# Patient Record
Sex: MALE | Race: WHITE | NOT HISPANIC OR LATINO | Employment: FULL TIME | URBAN - NONMETROPOLITAN AREA
[De-identification: names, ages, dates, MRNs, and addresses within clinical notes are randomized per-mention and may not be internally consistent; named-entity substitution may affect disease eponyms.]

---

## 2020-05-12 ENCOUNTER — HISTORICAL (OUTPATIENT)
Dept: ADMINISTRATIVE | Facility: HOSPITAL | Age: 55
End: 2020-05-12

## 2020-05-14 LAB — SARS-COV-2 RNA RESP QL NAA+PROBE: NOT DETECTED

## 2021-05-26 PROBLEM — I10 HYPERTENSION: Status: ACTIVE | Noted: 2021-05-26

## 2021-05-26 PROBLEM — K75.81 NASH (NONALCOHOLIC STEATOHEPATITIS): Status: ACTIVE | Noted: 2021-05-26

## 2021-05-26 PROBLEM — E03.9 HYPOTHYROIDISM: Status: ACTIVE | Noted: 2021-05-26

## 2021-05-26 PROBLEM — I65.29 CAROTID ARTERY STENOSIS: Status: ACTIVE | Noted: 2021-05-26

## 2021-05-26 PROBLEM — R73.01 IFG (IMPAIRED FASTING GLUCOSE): Status: ACTIVE | Noted: 2021-05-26

## 2021-05-26 PROBLEM — M25.50 JOINT PAIN: Status: ACTIVE | Noted: 2021-05-26

## 2021-05-26 PROBLEM — K42.9 UMBILICAL HERNIA: Status: ACTIVE | Noted: 2021-05-26

## 2021-05-26 PROBLEM — E78.5 HYPERLIPIDEMIA: Status: ACTIVE | Noted: 2021-05-26

## 2021-05-26 PROBLEM — E55.9 VITAMIN D DEFICIENCY: Status: ACTIVE | Noted: 2021-05-26

## 2021-10-27 DIAGNOSIS — M25.511 RIGHT SHOULDER PAIN: Primary | ICD-10-CM

## 2021-10-28 ENCOUNTER — HOSPITAL ENCOUNTER (OUTPATIENT)
Dept: RADIOLOGY | Facility: HOSPITAL | Age: 56
Discharge: HOME | End: 2021-10-28
Attending: PREVENTIVE MEDICINE
Payer: COMMERCIAL

## 2021-10-28 DIAGNOSIS — M25.511 RIGHT SHOULDER PAIN: ICD-10-CM

## 2021-10-28 PROCEDURE — 73221 MRI JOINT UPR EXTREM W/O DYE: CPT | Mod: TC,RT

## 2021-11-29 PROBLEM — Z00.00 ROUTINE GENERAL MEDICAL EXAMINATION AT A HEALTH CARE FACILITY: Status: ACTIVE | Noted: 2021-11-29

## 2021-11-29 PROBLEM — R80.9 MICROALBUMINURIA: Status: ACTIVE | Noted: 2021-11-29

## 2021-11-29 PROBLEM — J30.9 AR (ALLERGIC RHINITIS): Status: ACTIVE | Noted: 2021-11-29

## 2021-12-27 ENCOUNTER — HOSPITAL ENCOUNTER (OUTPATIENT)
Dept: RADIOLOGY | Facility: HOSPITAL | Age: 56
Discharge: HOME | End: 2021-12-27
Attending: NURSE PRACTITIONER
Payer: COMMERCIAL

## 2021-12-27 DIAGNOSIS — R22.2 LOCALIZED SWELLING, MASS AND LUMP, TRUNK: ICD-10-CM

## 2021-12-27 DIAGNOSIS — R05.9 COUGH: ICD-10-CM

## 2021-12-27 PROBLEM — R91.8 LUNG MASS: Status: ACTIVE | Noted: 2021-12-27

## 2021-12-27 PROCEDURE — 25500020 PHARM REV CODE 255: Performed by: NURSE PRACTITIONER

## 2021-12-27 PROCEDURE — 71270 CT THORAX DX C-/C+: CPT | Mod: TC

## 2021-12-27 RX ADMIN — IOHEXOL 100 ML: 350 INJECTION, SOLUTION INTRAVENOUS at 04:12

## 2021-12-30 ENCOUNTER — LAB VISIT (OUTPATIENT)
Dept: LAB | Facility: HOSPITAL | Age: 56
End: 2021-12-30
Attending: FAMILY MEDICINE
Payer: COMMERCIAL

## 2021-12-30 DIAGNOSIS — R91.8 LUNG MASS: ICD-10-CM

## 2021-12-30 DIAGNOSIS — R05.9 COUGH: ICD-10-CM

## 2021-12-30 PROCEDURE — 87070 CULTURE OTHR SPECIMN AEROBIC: CPT | Performed by: FAMILY MEDICINE

## 2021-12-30 PROCEDURE — 87102 FUNGUS ISOLATION CULTURE: CPT | Performed by: FAMILY MEDICINE

## 2021-12-30 PROCEDURE — 87205 SMEAR GRAM STAIN: CPT | Performed by: FAMILY MEDICINE

## 2021-12-30 PROCEDURE — 87106 FUNGI IDENTIFICATION YEAST: CPT | Performed by: FAMILY MEDICINE

## 2022-01-03 LAB
BACTERIA SPEC AEROBE CULT: NORMAL
BACTERIA SPEC AEROBE CULT: NORMAL
GRAM STN SPEC: NORMAL

## 2022-01-06 NOTE — PROGRESS NOTES
DATE OF SURGERY:  06/15/2017   SURGEON:  Michael Rick II, MD      PREOPERATIVE DIAGNOSIS:  Biliary colic.      POSTOPERATIVE DIAGNOSIS:  Biliary colic.      PROCEDURE:  Laparoscopic cholecystectomy.      ANESTHESIA:  General.      ESTIMATED BLOOD LOSS:  Minimal.      COMPLICATIONS:  None.      SPECIMEN:  Gallbladder.      DISPOSITION:  To PACU in stable condition.      DESCRIPTION OF PROCEDURE:  The patient was brought to the operative suite,   placed supine on operative table.  After adequate general anesthesia had been   achieved, the abdomen was prepped and draped in usual sterile fashion.   Then,   using a 15 blade, an infraumbilical curvilinear incision was made.  The   incision was extended down to the underlying subcutaneous tissue and cautery   was used for hemostasis.  The fascia was incised, then a Kinza trocar was   inserted to the abdomen.  The abdomen was then insufflated with CO2 to create   the pneumoperitoneum and 3 additional 5 mm trocars were inserted to the   abdomen on direct visualization, 2 in the left upper quadrant and 1 in the   right upper quadrant.  After this was completed, the gallbladder was   visualized, noted to be chronically inflamed.  It was then retracted cephalad.   Cystic duct and cystic artery were then both dissected out of the triangle of   Calot, clipped and divided.  The gallbladder was then dissected off the   gallbladder fossa Kinza excision and once completed, the gallbladder was   placed in an Endopouch to remove the umbilical port.  The pneumoperitoneum was   then released.  The fascia at the umbilicus was closed with 0 Vicryl suture in   a running fashion.  The skin incisions were then closed with 4-0 Vicryl in a   subcuticular fashion.  Dermabond dressings were applied.  The needle, sponge,   and instrument counts were correct at the end of the case.  The patient   tolerated the procedure well, was then transferred to the PACU in stable   condition.             Patient notified,Diflucan ordered            MD KERLINE Sevilla II/Eneida     DD:  06/15/2017 10:20:50 / DT:  06/15/2017 13:13:55     Job #:   [id number]

## 2022-01-11 ENCOUNTER — HOSPITAL ENCOUNTER (OUTPATIENT)
Dept: RADIOLOGY | Facility: HOSPITAL | Age: 57
Discharge: HOME | End: 2022-01-11
Attending: INTERNAL MEDICINE
Payer: COMMERCIAL

## 2022-01-11 DIAGNOSIS — R91.8 LUNG MASS: ICD-10-CM

## 2022-01-11 PROBLEM — E66.3 OVERWEIGHT: Status: ACTIVE | Noted: 2022-01-11

## 2022-01-11 PROCEDURE — 71046 X-RAY EXAM CHEST 2 VIEWS: CPT | Mod: TC

## 2022-01-31 LAB — FUNGUS SPEC CULT: ABNORMAL

## 2022-02-28 PROBLEM — Z00.00 ROUTINE GENERAL MEDICAL EXAMINATION AT A HEALTH CARE FACILITY: Status: RESOLVED | Noted: 2021-11-29 | Resolved: 2022-02-28

## 2022-06-20 DIAGNOSIS — H50.22 HYPOTROPIA OF LEFT EYE: Primary | ICD-10-CM

## 2022-06-22 ENCOUNTER — HOSPITAL ENCOUNTER (OUTPATIENT)
Dept: RADIOLOGY | Facility: HOSPITAL | Age: 57
Discharge: HOME | End: 2022-06-22
Attending: OPHTHALMOLOGY
Payer: COMMERCIAL

## 2022-06-22 DIAGNOSIS — H50.22 HYPOTROPIA OF LEFT EYE: ICD-10-CM

## 2022-06-22 PROCEDURE — 70480 CT ORBIT/EAR/FOSSA W/O DYE: CPT | Mod: TC

## 2022-11-14 PROBLEM — Z01.818 PREOPERATIVE CLEARANCE: Status: ACTIVE | Noted: 2022-11-14

## 2023-01-30 PROBLEM — E11.65 TYPE 2 DIABETES MELLITUS WITH HYPERGLYCEMIA, WITHOUT LONG-TERM CURRENT USE OF INSULIN: Status: ACTIVE | Noted: 2021-05-26

## 2023-05-01 PROBLEM — Z00.00 WELLNESS EXAMINATION: Status: RESOLVED | Noted: 2021-11-29 | Resolved: 2023-05-01

## 2023-09-25 ENCOUNTER — LAB VISIT (OUTPATIENT)
Dept: LAB | Facility: HOSPITAL | Age: 58
End: 2023-09-25
Attending: NURSE PRACTITIONER
Payer: COMMERCIAL

## 2023-09-25 DIAGNOSIS — Z79.899 ENCOUNTER FOR LONG-TERM (CURRENT) USE OF OTHER MEDICATIONS: ICD-10-CM

## 2023-09-25 DIAGNOSIS — E78.2 MIXED HYPERLIPIDEMIA: ICD-10-CM

## 2023-09-25 DIAGNOSIS — E03.9 HYPOTHYROIDISM, UNSPECIFIED TYPE: ICD-10-CM

## 2023-09-25 DIAGNOSIS — I10 PRIMARY HYPERTENSION: ICD-10-CM

## 2023-09-25 DIAGNOSIS — E11.65 TYPE 2 DIABETES MELLITUS WITH HYPERGLYCEMIA, WITHOUT LONG-TERM CURRENT USE OF INSULIN: ICD-10-CM

## 2023-09-25 LAB
ALBUMIN SERPL BCP-MCNC: 4.2 G/DL (ref 3.5–5.2)
ALP SERPL-CCNC: 73 U/L (ref 55–135)
ALT SERPL W/O P-5'-P-CCNC: 31 U/L (ref 10–44)
ANION GAP SERPL CALC-SCNC: 4 MMOL/L (ref 3–11)
AST SERPL-CCNC: 18 U/L (ref 10–40)
BILIRUB SERPL-MCNC: 0.4 MG/DL (ref 0.1–1)
BUN SERPL-MCNC: 9 MG/DL (ref 6–20)
CALCIUM SERPL-MCNC: 8.7 MG/DL (ref 8.7–10.5)
CHLORIDE SERPL-SCNC: 104 MMOL/L (ref 95–110)
CO2 SERPL-SCNC: 27 MMOL/L (ref 23–29)
CREAT SERPL-MCNC: 0.9 MG/DL (ref 0.5–1.4)
EST. GFR  (NO RACE VARIABLE): >60 ML/MIN/1.73 M^2
ESTIMATED AVG GLUCOSE: 114 MG/DL (ref 68–131)
GLUCOSE SERPL-MCNC: 115 MG/DL (ref 70–110)
HBA1C MFR BLD: 5.6 % (ref 4–5.6)
POTASSIUM SERPL-SCNC: 4.1 MMOL/L (ref 3.5–5.1)
PROT SERPL-MCNC: 7.7 G/DL (ref 6–8.4)
SODIUM SERPL-SCNC: 135 MMOL/L (ref 136–145)
T3FREE SERPL-MCNC: 2.5 PG/ML (ref 2.3–4.2)
T4 FREE SERPL-MCNC: 0.92 NG/DL (ref 0.71–1.51)
TSH SERPL DL<=0.005 MIU/L-ACNC: 3.76 UIU/ML (ref 0.4–4)

## 2023-09-25 PROCEDURE — 80053 COMPREHEN METABOLIC PANEL: CPT | Performed by: NURSE PRACTITIONER

## 2023-09-25 PROCEDURE — 83036 HEMOGLOBIN GLYCOSYLATED A1C: CPT | Performed by: NURSE PRACTITIONER

## 2023-09-25 PROCEDURE — 36415 COLL VENOUS BLD VENIPUNCTURE: CPT | Performed by: NURSE PRACTITIONER

## 2023-09-25 PROCEDURE — 84443 ASSAY THYROID STIM HORMONE: CPT | Performed by: NURSE PRACTITIONER

## 2023-09-25 PROCEDURE — 84481 FREE ASSAY (FT-3): CPT | Performed by: NURSE PRACTITIONER

## 2023-09-25 PROCEDURE — 84439 ASSAY OF FREE THYROXINE: CPT | Performed by: NURSE PRACTITIONER

## 2023-10-10 PROBLEM — R60.0 BILATERAL LOWER EXTREMITY EDEMA: Status: ACTIVE | Noted: 2023-10-10

## 2024-03-12 ENCOUNTER — LAB VISIT (OUTPATIENT)
Dept: LAB | Facility: HOSPITAL | Age: 59
End: 2024-03-12
Attending: NURSE PRACTITIONER
Payer: COMMERCIAL

## 2024-03-12 DIAGNOSIS — I65.23 BILATERAL CAROTID ARTERY STENOSIS: ICD-10-CM

## 2024-03-12 DIAGNOSIS — E11.65 TYPE 2 DIABETES MELLITUS WITH HYPERGLYCEMIA, WITHOUT LONG-TERM CURRENT USE OF INSULIN: ICD-10-CM

## 2024-03-12 DIAGNOSIS — I10 HYPERTENSION, UNSPECIFIED TYPE: ICD-10-CM

## 2024-03-12 DIAGNOSIS — R73.01 IMPAIRED FASTING GLUCOSE: ICD-10-CM

## 2024-03-12 DIAGNOSIS — E78.2 MIXED HYPERLIPIDEMIA: ICD-10-CM

## 2024-03-12 DIAGNOSIS — E55.9 VITAMIN D DEFICIENCY: ICD-10-CM

## 2024-03-12 DIAGNOSIS — I15.2 HYPERTENSION ASSOCIATED WITH TYPE 2 DIABETES MELLITUS: ICD-10-CM

## 2024-03-12 DIAGNOSIS — K75.81 NASH (NONALCOHOLIC STEATOHEPATITIS): ICD-10-CM

## 2024-03-12 DIAGNOSIS — R73.01 IFG (IMPAIRED FASTING GLUCOSE): ICD-10-CM

## 2024-03-12 DIAGNOSIS — E63.0 ESSENTIAL FATTY ACID DEFICIENCY: ICD-10-CM

## 2024-03-12 DIAGNOSIS — R80.9 MICROALBUMINURIA: ICD-10-CM

## 2024-03-12 DIAGNOSIS — E11.59 HYPERTENSION ASSOCIATED WITH TYPE 2 DIABETES MELLITUS: ICD-10-CM

## 2024-03-12 DIAGNOSIS — E03.9 HYPOTHYROIDISM, UNSPECIFIED TYPE: ICD-10-CM

## 2024-03-12 DIAGNOSIS — E78.5 HYPERLIPIDEMIA, UNSPECIFIED HYPERLIPIDEMIA TYPE: ICD-10-CM

## 2024-03-12 DIAGNOSIS — I65.21 OCCLUSION OF RIGHT CAROTID ARTERY: ICD-10-CM

## 2024-03-12 DIAGNOSIS — I10 PRIMARY HYPERTENSION: ICD-10-CM

## 2024-03-12 DIAGNOSIS — Z79.899 ENCOUNTER FOR LONG-TERM (CURRENT) USE OF OTHER MEDICATIONS: ICD-10-CM

## 2024-03-12 LAB
ALBUMIN SERPL BCP-MCNC: 4 G/DL (ref 3.5–5.2)
ALBUMIN/CREAT UR: 113.6 UG/MG (ref 0–30)
ALP SERPL-CCNC: 68 U/L (ref 55–135)
ALT SERPL W/O P-5'-P-CCNC: 30 U/L (ref 10–44)
ANION GAP SERPL CALC-SCNC: 6 MMOL/L (ref 3–11)
AST SERPL-CCNC: 17 U/L (ref 10–40)
BASOPHILS # BLD AUTO: 0.05 K/UL (ref 0–0.2)
BASOPHILS NFR BLD: 0.6 % (ref 0–1.9)
BILIRUB SERPL-MCNC: 0.5 MG/DL (ref 0.1–1)
BUN SERPL-MCNC: 12 MG/DL (ref 6–20)
CALCIUM SERPL-MCNC: 9.2 MG/DL (ref 8.7–10.5)
CHLORIDE SERPL-SCNC: 102 MMOL/L (ref 95–110)
CHOLEST SERPL-MCNC: 231 MG/DL (ref 120–199)
CHOLEST/HDLC SERPL: 4.5 {RATIO} (ref 2–5)
CO2 SERPL-SCNC: 28 MMOL/L (ref 23–29)
CREAT SERPL-MCNC: 0.9 MG/DL (ref 0.5–1.4)
CREAT UR-MCNC: 94.2 MG/DL (ref 23–375)
DIFFERENTIAL METHOD BLD: ABNORMAL
EOSINOPHIL # BLD AUTO: 0.2 K/UL (ref 0–0.5)
EOSINOPHIL NFR BLD: 2.4 % (ref 0–8)
ERYTHROCYTE [DISTWIDTH] IN BLOOD BY AUTOMATED COUNT: 13.6 % (ref 11.5–14.5)
EST. GFR  (NO RACE VARIABLE): >60 ML/MIN/1.73 M^2
ESTIMATED AVG GLUCOSE: 120 MG/DL (ref 68–131)
GLUCOSE SERPL-MCNC: 132 MG/DL (ref 70–110)
HBA1C MFR BLD: 5.8 % (ref 4–5.6)
HCT VFR BLD AUTO: 42.5 % (ref 40–54)
HDLC SERPL-MCNC: 51 MG/DL (ref 40–75)
HDLC SERPL: 22.1 % (ref 20–50)
HGB BLD-MCNC: 14.4 G/DL (ref 14–18)
IMM GRANULOCYTES # BLD AUTO: 0.04 K/UL (ref 0–0.04)
IMM GRANULOCYTES NFR BLD AUTO: 0.5 % (ref 0–0.5)
LDLC SERPL CALC-MCNC: 153.6 MG/DL (ref 63–159)
LYMPHOCYTES # BLD AUTO: 2.1 K/UL (ref 1–4.8)
LYMPHOCYTES NFR BLD: 24.8 % (ref 18–48)
MCH RBC QN AUTO: 32.2 PG (ref 27–31)
MCHC RBC AUTO-ENTMCNC: 33.9 G/DL (ref 32–36)
MCV RBC AUTO: 95 FL (ref 82–98)
MICROALBUMIN UR DL<=1MG/L-MCNC: 107 MG/L
MONOCYTES # BLD AUTO: 0.6 K/UL (ref 0.3–1)
MONOCYTES NFR BLD: 7.7 % (ref 4–15)
NEUTROPHILS # BLD AUTO: 5.3 K/UL (ref 1.8–7.7)
NEUTROPHILS NFR BLD: 64 % (ref 38–73)
NONHDLC SERPL-MCNC: 180 MG/DL
NRBC BLD-RTO: 0 /100 WBC
PLATELET # BLD AUTO: 237 K/UL (ref 150–450)
PMV BLD AUTO: 9.8 FL (ref 9.2–12.9)
POTASSIUM SERPL-SCNC: 4.2 MMOL/L (ref 3.5–5.1)
PROT SERPL-MCNC: 7.7 G/DL (ref 6–8.4)
RBC # BLD AUTO: 4.47 M/UL (ref 4.6–6.2)
SODIUM SERPL-SCNC: 136 MMOL/L (ref 136–145)
T3FREE SERPL-MCNC: 2.3 PG/ML (ref 2.3–4.2)
T4 FREE SERPL-MCNC: 0.73 NG/DL (ref 0.71–1.51)
TRIGL SERPL-MCNC: 132 MG/DL (ref 30–150)
TSH SERPL DL<=0.005 MIU/L-ACNC: 57.6 UIU/ML (ref 0.4–4)
WBC # BLD AUTO: 8.31 K/UL (ref 3.9–12.7)

## 2024-03-12 PROCEDURE — 83036 HEMOGLOBIN GLYCOSYLATED A1C: CPT | Performed by: NURSE PRACTITIONER

## 2024-03-12 PROCEDURE — 84443 ASSAY THYROID STIM HORMONE: CPT | Performed by: NURSE PRACTITIONER

## 2024-03-12 PROCEDURE — 84481 FREE ASSAY (FT-3): CPT | Performed by: NURSE PRACTITIONER

## 2024-03-12 PROCEDURE — 80061 LIPID PANEL: CPT | Performed by: NURSE PRACTITIONER

## 2024-03-12 PROCEDURE — 85025 COMPLETE CBC W/AUTO DIFF WBC: CPT | Performed by: NURSE PRACTITIONER

## 2024-03-12 PROCEDURE — 82043 UR ALBUMIN QUANTITATIVE: CPT | Performed by: NURSE PRACTITIONER

## 2024-03-12 PROCEDURE — 36415 COLL VENOUS BLD VENIPUNCTURE: CPT | Performed by: NURSE PRACTITIONER

## 2024-03-12 PROCEDURE — 80053 COMPREHEN METABOLIC PANEL: CPT | Performed by: NURSE PRACTITIONER

## 2024-03-12 PROCEDURE — 84439 ASSAY OF FREE THYROXINE: CPT | Performed by: NURSE PRACTITIONER

## 2024-03-26 PROBLEM — H50.22 HYPERTROPIA OF LEFT EYE: Status: ACTIVE | Noted: 2024-03-26

## 2024-05-17 ENCOUNTER — LAB VISIT (OUTPATIENT)
Dept: LAB | Facility: HOSPITAL | Age: 59
End: 2024-05-17
Attending: NURSE PRACTITIONER
Payer: COMMERCIAL

## 2024-05-17 DIAGNOSIS — E03.9 HYPOTHYROIDISM, UNSPECIFIED TYPE: ICD-10-CM

## 2024-05-17 LAB
T3FREE SERPL-MCNC: 3.1 PG/ML (ref 2.3–4.2)
T4 FREE SERPL-MCNC: 1.52 NG/DL (ref 0.71–1.51)
TSH SERPL DL<=0.005 MIU/L-ACNC: 0.15 UIU/ML (ref 0.4–4)

## 2024-05-17 PROCEDURE — 84443 ASSAY THYROID STIM HORMONE: CPT | Performed by: NURSE PRACTITIONER

## 2024-05-17 PROCEDURE — 84481 FREE ASSAY (FT-3): CPT | Performed by: NURSE PRACTITIONER

## 2024-05-17 PROCEDURE — 84439 ASSAY OF FREE THYROXINE: CPT | Performed by: NURSE PRACTITIONER

## 2024-05-17 PROCEDURE — 36415 COLL VENOUS BLD VENIPUNCTURE: CPT | Performed by: NURSE PRACTITIONER

## 2024-09-02 PROBLEM — Z00.00 WELLNESS EXAMINATION: Status: RESOLVED | Noted: 2021-11-29 | Resolved: 2024-09-02

## 2024-10-21 DIAGNOSIS — Z12.11 SCREENING FOR COLON CANCER: ICD-10-CM

## 2024-11-01 ENCOUNTER — OFFICE VISIT (OUTPATIENT)
Dept: OPHTHALMOLOGY | Facility: CLINIC | Age: 59
End: 2024-11-01
Payer: COMMERCIAL

## 2024-11-01 VITALS — BODY MASS INDEX: 28.34 KG/M2 | HEIGHT: 66 IN | WEIGHT: 176.38 LBS

## 2024-11-01 DIAGNOSIS — E11.9 TYPE 2 DIABETES MELLITUS WITHOUT RETINOPATHY: ICD-10-CM

## 2024-11-01 DIAGNOSIS — H50.22 HYPERTROPIA OF LEFT EYE: ICD-10-CM

## 2024-11-01 DIAGNOSIS — H02.402 PTOSIS, LEFT EYELID: Primary | ICD-10-CM

## 2024-11-01 PROCEDURE — 99214 OFFICE O/P EST MOD 30 MIN: CPT | Mod: PBBFAC,PN

## 2024-11-04 PROBLEM — H02.402 PTOSIS, LEFT EYELID: Status: ACTIVE | Noted: 2024-11-04

## 2024-11-06 ENCOUNTER — TELEPHONE (OUTPATIENT)
Dept: PHARMACY | Facility: CLINIC | Age: 59
End: 2024-11-06
Payer: COMMERCIAL

## 2024-11-06 NOTE — TELEPHONE ENCOUNTER
[facility]/Population Health Chart Review & Patient Outreach Details For Medication Adherence Project    Reason for Outreach Encounter: 3rd Party payor non-compliance report ([employer], BCBS, [employer], etc)  2.  Patient Outreach Method: Reviewed patient chart   3.   Medication in question:   Diabetes Medications               semaglutide (OZEMPIC) 0.25 mg or 0.5 mg (2 mg/3 mL) pen injector INJECT 0.5 MG INTO THE SKIN EVERY 7 DAYS                 ozempic  last filled  10/22 for 28 day supply      4.  Reviewed and or Updates Made To: Patient Chart  5. Outreach Outcomes and/or actions taken: Patient filled medication and is on track to be adherent  Additional Notes:

## 2024-11-07 ENCOUNTER — TELEPHONE (OUTPATIENT)
Dept: PHARMACY | Facility: CLINIC | Age: 59
End: 2024-11-07
Payer: COMMERCIAL

## 2024-11-07 NOTE — TELEPHONE ENCOUNTER
[facility]/Population Health Chart Review & Patient Outreach Details For Medication Adherence Project    Reason for Outreach Encounter: 3rd Party payor non-compliance report ([employer], BCBS, UHC, etc)  2.  Patient Outreach Method: Reviewed patient chart   3.   Medication in question: pitavastatin 4 mg     pitavastatin  last filled  8/22/24 for 90 day supply    4.  Reviewed and or Updates Made To: Patient Chart  5. Outreach Outcomes and/or actions taken: Patient filled medication and is on track to be adherent  Additional Notes:

## 2024-12-23 ENCOUNTER — TELEPHONE (OUTPATIENT)
Dept: PHARMACY | Facility: CLINIC | Age: 59
End: 2024-12-23
Payer: COMMERCIAL

## 2025-01-06 ENCOUNTER — OFFICE VISIT (OUTPATIENT)
Dept: OPHTHALMOLOGY | Facility: CLINIC | Age: 60
End: 2025-01-06
Payer: COMMERCIAL

## 2025-01-06 DIAGNOSIS — H50.22 HYPERTROPIA OF LEFT EYE: Primary | ICD-10-CM

## 2025-01-06 DIAGNOSIS — S02.32XS CLOSED FRACTURE OF LEFT ORBITAL FLOOR, SEQUELA: ICD-10-CM

## 2025-01-06 PROCEDURE — 99214 OFFICE O/P EST MOD 30 MIN: CPT | Mod: S$GLB,,, | Performed by: STUDENT IN AN ORGANIZED HEALTH CARE EDUCATION/TRAINING PROGRAM

## 2025-01-06 PROCEDURE — 92060 SENSORIMOTOR EXAMINATION: CPT | Mod: S$GLB,,, | Performed by: STUDENT IN AN ORGANIZED HEALTH CARE EDUCATION/TRAINING PROGRAM

## 2025-01-06 PROCEDURE — 1159F MED LIST DOCD IN RCRD: CPT | Mod: CPTII,S$GLB,, | Performed by: STUDENT IN AN ORGANIZED HEALTH CARE EDUCATION/TRAINING PROGRAM

## 2025-01-06 PROCEDURE — 1160F RVW MEDS BY RX/DR IN RCRD: CPT | Mod: CPTII,S$GLB,, | Performed by: STUDENT IN AN ORGANIZED HEALTH CARE EDUCATION/TRAINING PROGRAM

## 2025-01-06 PROCEDURE — 99999 PR PBB SHADOW E&M-EST. PATIENT-LVL III: CPT | Mod: PBBFAC,,, | Performed by: STUDENT IN AN ORGANIZED HEALTH CARE EDUCATION/TRAINING PROGRAM

## 2025-01-06 NOTE — PROGRESS NOTES
HPI    DLS: 11/1/2024 Dr. Toyin Ramirez   Gavin Lafleur is a 59 y.o. male who comes in for an evaluation of   strabismus. He states that his left eye constantly drifts upward since   injuring his left eye by hitting it on a vanity while sleep walking in   2022. He had a repair with plate in November 2022 with Dr. Gaming. He also   notes ZAHIDA droopiness, occasional left eye pain and floaters.    Pt reports he does have double vision if he concentrates but a lot of   timews can ignore the second image. Denies blurry vision.    Refresh gtts BID OU    Last edited by Leonardo Snyder MD on 1/6/2025  4:14 PM.        ROS    Negative for: Constitutional, Gastrointestinal, Neurological, Skin,   Genitourinary, Musculoskeletal, HENT, Endocrine, Cardiovascular, Eyes,   Respiratory, Psychiatric, Allergic/Imm, Heme/Lymph  Last edited by Leonardo Snyder MD on 1/6/2025  4:14 PM.        Assessment /Plan     For exam results, see Encounter Report.    Hypertropia of left eye    Closed fracture of left orbital floor, sequela        Problem List Items Addressed This Visit          Ophtho    Hypertropia of left eye - Primary    Current Assessment & Plan     Injury sustain to left orbit on 2/2/22   CT Orbits: 1. Mild fat stranding within the inferior retrobulbar left orbit, possibly inflammatory or posttraumatic.  2. Deformity left orbital floor, possibly related to old fracture.  3. Moderate paranasal sinus disease.    11/21/22 - S/p fracture repair with plate with Dr. Gaming    3/20/23: Seen with Dr. Molina with large left hypertropia - planned for surgery but postponed duet family issues     1/6/25: Patient bothered by large degree of strabismus. Has double vision and interferes with daily interactions.  Exam with large left hypertropia. Poor infraduction left eye but only mildly reduced saccades     Plan:  Discussed risks and benefits of strabismus surgery  Benefits include: realign eyes and improved binocularity  Risks include: Pain,  bleeding infection, transient or permanent redness, less attractive appearance, decreased vision, loss of vision, undercorrection, overcorrection, double vision, need for future surgery    Patient elects to proceed with strabismus surgery  Consider LSRc and LIR resection. Will do intra-op forced ductions. May need to do contralateral surgery vs staged procedure. Will discuss surgical plan with colleagues          Other Visit Diagnoses       Closed fracture of left orbital floor, sequela                 Leonardo Snyder MD  Pediatric Ophthalmology and Adult Strabismus  [facility]

## 2025-01-06 NOTE — ASSESSMENT & PLAN NOTE
Injury sustain to left orbit on 2/2/22   CT Orbits: 1. Mild fat stranding within the inferior retrobulbar left orbit, possibly inflammatory or posttraumatic.  2. Deformity left orbital floor, possibly related to old fracture.  3. Moderate paranasal sinus disease.    11/21/22 - S/p fracture repair with plate with Dr. Gaming    3/20/23: Seen with Dr. Molina with large left hypertropia - planned for surgery but postponed duet family issues     1/6/25: Patient bothered by large degree of strabismus. Has double vision and interferes with daily interactions.  Exam with large left hypertropia. Poor infraduction left eye but only mildly reduced saccades     Plan:  Discussed risks and benefits of strabismus surgery  Benefits include: realign eyes and improved binocularity  Risks include: Pain, bleeding infection, transient or permanent redness, less attractive appearance, decreased vision, loss of vision, undercorrection, overcorrection, double vision, need for future surgery    Patient elects to proceed with strabismus surgery  Consider LSRc and LIR resection. Will do intra-op forced ductions. May need to do contralateral surgery vs staged procedure. Will discuss surgical plan with colleagues

## 2025-01-08 ENCOUNTER — TELEPHONE (OUTPATIENT)
Dept: OPHTHALMOLOGY | Facility: CLINIC | Age: 60
End: 2025-01-08
Payer: COMMERCIAL

## 2025-01-08 DIAGNOSIS — H50.22 HYPERTROPIA OF LEFT EYE: Primary | ICD-10-CM

## 2025-02-20 ENCOUNTER — HOSPITAL ENCOUNTER (OUTPATIENT)
Dept: RADIOLOGY | Facility: HOSPITAL | Age: 60
Discharge: HOME | End: 2025-02-20
Attending: NURSE PRACTITIONER
Payer: COMMERCIAL

## 2025-02-20 DIAGNOSIS — Z01.818 PRE-OP EXAM: ICD-10-CM

## 2025-02-20 PROCEDURE — 71046 X-RAY EXAM CHEST 2 VIEWS: CPT | Mod: TC

## 2025-02-22 ENCOUNTER — TELEPHONE (OUTPATIENT)
Dept: PHARMACY | Facility: CLINIC | Age: 60
End: 2025-02-22
Payer: COMMERCIAL

## 2025-02-23 NOTE — TELEPHONE ENCOUNTER
[facility]/Population Health Chart Review & Patient Outreach Details For Medication Adherence Project    Reason for Outreach Encounter: 3rd Party payor non-compliance report ([employer], BCBS, UHC, etc)  2.  Patient Outreach Method: Reviewed patient chart   3.   Medication in question:    Hyperlipidemia Medications              pitavastatin calcium (LIVALO) 4 mg Tab TAKE 1 TABLET BY MOUTH ONCE DAILY IN THE EVENING                  pitavastatin  last filled  1/4 for 90 day supply      4.  Reviewed and or Updates Made To: Patient Chart  5. Outreach Outcomes and/or actions taken: Patient filled medication and is on track to be adherent  Additional Notes:

## 2025-02-25 ENCOUNTER — TELEPHONE (OUTPATIENT)
Dept: OPHTHALMOLOGY | Facility: CLINIC | Age: 60
End: 2025-02-25
Payer: COMMERCIAL

## 2025-02-25 NOTE — PRE-PROCEDURE INSTRUCTIONS
PreOp Instructions given:    -- Medication information (what to hold and what to take)   -- NPO guidelines as follows: (or as per your Surgeon)  Stop ALL solid food, gum, candy 8 hours before arrival time.  Stop all CLOUDY liquids: coffee with creamer, cloudy juices, 8 hours prior to arrival time.  The patient should be ENCOURAGED to drink carbohydrate-rich clear liquids (sports drinks, clear juices) until 2 hours prior to arrival time.  Stop clear liquids 2 hours prior to arrival time.  CLEAR liquids include water, black coffee NO creamer, clear oral rehydration drinks, clear sports drinks and clear fruit juices (no orange juice, no pulpy juices, no apple cider).   IF IN DOUBT, drink water instead.   NOTHING TO DRINK 2 hours before to surgery/procedure  time. If you are told to take medication on the morning of surgery, it may be taken with a sip of water.   -- *Arrival place and directions given*.  Time to be given the day before procedure by the Surgeon's Office   -- Bathe with antibacterial soap (dial or Hibiclens as instructed)  -- Don't wear any jewelry or valuables and not metals on skin or hair AM of surgery   -- No makeup or moisturizer to face   -- No perfume/cologne, powder, lotions, aftershave or deodorant    Pt verbalized understanding.         *If going to , see below:     Directions and Instructions for [facility]   At [facility], we have an outstanding team of physicians, anesthesiologists, CRNAs, Registered Nurses, Surgical Technologists, and other ancillary team members all focused on your surgical and procedural care.   Before Your Procedure:   The physician's office will call you with a specific arrival time and directions a day or two before your scheduled procedure. You may also receive these instructions through your [facility] portal.   Day of Procedure:   Please be sure to arrive at the arrival time given or you may risk your surgery being delayed or  canceled. The arrival time is earlier than your scheduled surgery or procedure time. In the winter months please dress warm and bring blankets for you or your child as the waiting room may be cold. If you have difficulty locating the facility, please give us a call at [phone].   Directions:   The [facility] is located on the 1st floor of the hospital building near the [place] entrance.   Parking:   You will park in the [facility] (note location on map). [facility] opens at 5:00 a.m. and has a drop off area by the entrance.  parking is available starting at 7:00 a.m. Please see below for further  parking instructions.   Directions from the parking garage elevators   Blue [facility] Elevators: From the parking garage, take the blue [place] elevators (located in the center of the parking garage) to the 1st floor of the garage. You will then take a right once off the elevators then another right to the outside of the parking garage. You will be across from the [facility]. You will walk down the sidewalk, pass the  curve at the [place] entrance and continue to follow the sidewalk. You will pass the radiation oncology entrance on your right. Continue to follow the sidewalk to the [facility] glass door entrance.   Hospital Entrance (Inside Route): If a mostly inside route is preferred: Take the inside elevator bank (located at the far north end of the garage) from the parking garage to the 1st floor. On the 1st floor walk past [employer]. Keep walking down the center of the hallway towards the hospital elevators. Once you reach the red brick brianda, take a left and go past the hospital elevators. Take another left and follow the blue and white [place] signs around the hallway to the end. Go outside of the door. You will see the [facility] entrance to your right.   Drop Off:   There is a drop off area at  the doors of the [facility] for your convenience. If utilized for pediatric patients, an adult must accompany the patient into the surgery center while another adult lucas the vehicle.   Erin (at 7:00 a.m.):   Upon check-in, please let the  know that you are utilizing [facility] parking which is free. The . will then call [employer] for your car to be picked up. Your keys and phone number will be collected and given to [employer] services. You will then be given a ticket. Upon discharge, [facility] will be notified to bring your vehicle back when you are ready.   2/6/2024      If going to 2nd floor surgery center, see below:    Directions to the 2nd floor ([facility]) Surgery Center  The hallway to get to the surgery center is on the 2nd fl between the gold elevators in the atrium.  Follow the hallway into the waiting room (has a fish tank) and check in at desk.

## 2025-02-27 ENCOUNTER — HOSPITAL ENCOUNTER (OUTPATIENT)
Facility: HOSPITAL | Age: 60
Discharge: HOME | End: 2025-02-27
Attending: STUDENT IN AN ORGANIZED HEALTH CARE EDUCATION/TRAINING PROGRAM | Admitting: STUDENT IN AN ORGANIZED HEALTH CARE EDUCATION/TRAINING PROGRAM
Payer: COMMERCIAL

## 2025-02-27 ENCOUNTER — ANESTHESIA EVENT (OUTPATIENT)
Dept: SURGERY | Facility: HOSPITAL | Age: 60
End: 2025-02-27
Payer: COMMERCIAL

## 2025-02-27 ENCOUNTER — ANESTHESIA (OUTPATIENT)
Dept: SURGERY | Facility: HOSPITAL | Age: 60
End: 2025-02-27
Payer: COMMERCIAL

## 2025-02-27 VITALS
RESPIRATION RATE: 20 BRPM | WEIGHT: 179 LBS | BODY MASS INDEX: 28.77 KG/M2 | HEIGHT: 66 IN | OXYGEN SATURATION: 98 % | SYSTOLIC BLOOD PRESSURE: 156 MMHG | DIASTOLIC BLOOD PRESSURE: 91 MMHG | HEART RATE: 91 BPM | TEMPERATURE: 97 F

## 2025-02-27 DIAGNOSIS — H50.22 HYPERTROPIA OF LEFT EYE: Primary | ICD-10-CM

## 2025-02-27 PROCEDURE — 25000003 PHARM REV CODE 250: Performed by: NURSE ANESTHETIST, CERTIFIED REGISTERED

## 2025-02-27 PROCEDURE — 63600175 PHARM REV CODE 636 W HCPCS: Mod: JZ,TB | Performed by: ANESTHESIOLOGY

## 2025-02-27 PROCEDURE — 63600175 PHARM REV CODE 636 W HCPCS: Performed by: NURSE ANESTHETIST, CERTIFIED REGISTERED

## 2025-02-27 PROCEDURE — 36000707: Performed by: STUDENT IN AN ORGANIZED HEALTH CARE EDUCATION/TRAINING PROGRAM

## 2025-02-27 PROCEDURE — 71000044 HC DOSC ROUTINE RECOVERY FIRST HOUR: Performed by: STUDENT IN AN ORGANIZED HEALTH CARE EDUCATION/TRAINING PROGRAM

## 2025-02-27 PROCEDURE — 67331 EYE SURGERY FOLLOW-UP ADD-ON: CPT | Mod: LT,,, | Performed by: STUDENT IN AN ORGANIZED HEALTH CARE EDUCATION/TRAINING PROGRAM

## 2025-02-27 PROCEDURE — 37000009 HC ANESTHESIA EA ADD 15 MINS: Performed by: STUDENT IN AN ORGANIZED HEALTH CARE EDUCATION/TRAINING PROGRAM

## 2025-02-27 PROCEDURE — 25000003 PHARM REV CODE 250

## 2025-02-27 PROCEDURE — 36000706: Performed by: STUDENT IN AN ORGANIZED HEALTH CARE EDUCATION/TRAINING PROGRAM

## 2025-02-27 PROCEDURE — 82962 GLUCOSE BLOOD TEST: CPT | Performed by: STUDENT IN AN ORGANIZED HEALTH CARE EDUCATION/TRAINING PROGRAM

## 2025-02-27 PROCEDURE — 71000015 HC POSTOP RECOV 1ST HR: Performed by: STUDENT IN AN ORGANIZED HEALTH CARE EDUCATION/TRAINING PROGRAM

## 2025-02-27 PROCEDURE — 67316 REVISE TWO EYE MUSCLES: CPT | Mod: LT,,, | Performed by: STUDENT IN AN ORGANIZED HEALTH CARE EDUCATION/TRAINING PROGRAM

## 2025-02-27 PROCEDURE — 37000008 HC ANESTHESIA 1ST 15 MINUTES: Performed by: STUDENT IN AN ORGANIZED HEALTH CARE EDUCATION/TRAINING PROGRAM

## 2025-02-27 RX ORDER — LIDOCAINE HYDROCHLORIDE 20 MG/ML
INJECTION, SOLUTION EPIDURAL; INFILTRATION; INTRACAUDAL; PERINEURAL
Status: DISCONTINUED | OUTPATIENT
Start: 2025-02-27 | End: 2025-02-27

## 2025-02-27 RX ORDER — PHENYLEPHRINE HYDROCHLORIDE 25 MG/ML
SOLUTION/ DROPS OPHTHALMIC
Status: DISCONTINUED | OUTPATIENT
Start: 2025-02-27 | End: 2025-02-27 | Stop reason: HOSPADM

## 2025-02-27 RX ORDER — PHENYLEPHRINE HYDROCHLORIDE 10 MG/ML
INJECTION INTRAVENOUS
Status: DISCONTINUED | OUTPATIENT
Start: 2025-02-27 | End: 2025-02-27

## 2025-02-27 RX ORDER — DEXAMETHASONE SODIUM PHOSPHATE 4 MG/ML
INJECTION, SOLUTION INTRA-ARTICULAR; INTRALESIONAL; INTRAMUSCULAR; INTRAVENOUS; SOFT TISSUE
Status: DISCONTINUED | OUTPATIENT
Start: 2025-02-27 | End: 2025-02-27

## 2025-02-27 RX ORDER — SODIUM CHLORIDE 0.9 % (FLUSH) 0.9 %
3 SYRINGE (ML) INJECTION
Status: DISCONTINUED | OUTPATIENT
Start: 2025-02-27 | End: 2025-02-28 | Stop reason: HOSPADM

## 2025-02-27 RX ORDER — HYDROMORPHONE HYDROCHLORIDE 1 MG/ML
0.2 INJECTION, SOLUTION INTRAMUSCULAR; INTRAVENOUS; SUBCUTANEOUS EVERY 5 MIN PRN
Status: DISCONTINUED | OUTPATIENT
Start: 2025-02-27 | End: 2025-02-28 | Stop reason: HOSPADM

## 2025-02-27 RX ORDER — GLUCAGON 1 MG
1 KIT INJECTION
Status: DISCONTINUED | OUTPATIENT
Start: 2025-02-27 | End: 2025-02-28 | Stop reason: HOSPADM

## 2025-02-27 RX ORDER — PROPOFOL 10 MG/ML
VIAL (ML) INTRAVENOUS
Status: DISCONTINUED | OUTPATIENT
Start: 2025-02-27 | End: 2025-02-27

## 2025-02-27 RX ORDER — FENTANYL CITRATE 50 UG/ML
INJECTION, SOLUTION INTRAMUSCULAR; INTRAVENOUS
Status: DISCONTINUED | OUTPATIENT
Start: 2025-02-27 | End: 2025-02-27

## 2025-02-27 RX ORDER — DEXMEDETOMIDINE HYDROCHLORIDE 100 UG/ML
INJECTION, SOLUTION INTRAVENOUS
Status: DISCONTINUED | OUTPATIENT
Start: 2025-02-27 | End: 2025-02-27

## 2025-02-27 RX ORDER — PHENYLEPHRINE HYDROCHLORIDE 25 MG/ML
SOLUTION/ DROPS OPHTHALMIC
Status: DISCONTINUED
Start: 2025-02-27 | End: 2025-02-27 | Stop reason: HOSPADM

## 2025-02-27 RX ORDER — TRAMADOL HYDROCHLORIDE 50 MG/1
50 TABLET ORAL EVERY 6 HOURS PRN
Qty: 12 TABLET | Refills: 0 | Status: SHIPPED | OUTPATIENT
Start: 2025-02-27

## 2025-02-27 RX ORDER — SODIUM CHLORIDE 9 MG/ML
INJECTION, SOLUTION INTRAVENOUS CONTINUOUS
Status: DISCONTINUED | OUTPATIENT
Start: 2025-02-27 | End: 2025-02-28 | Stop reason: HOSPADM

## 2025-02-27 RX ORDER — HALOPERIDOL 5 MG/ML
0.5 INJECTION INTRAMUSCULAR EVERY 10 MIN PRN
Status: DISCONTINUED | OUTPATIENT
Start: 2025-02-27 | End: 2025-02-28 | Stop reason: HOSPADM

## 2025-02-27 RX ORDER — ONDANSETRON HYDROCHLORIDE 2 MG/ML
4 INJECTION, SOLUTION INTRAVENOUS DAILY PRN
Status: DISCONTINUED | OUTPATIENT
Start: 2025-02-27 | End: 2025-02-28 | Stop reason: HOSPADM

## 2025-02-27 RX ORDER — MIDAZOLAM HYDROCHLORIDE 1 MG/ML
INJECTION INTRAMUSCULAR; INTRAVENOUS
Status: DISCONTINUED | OUTPATIENT
Start: 2025-02-27 | End: 2025-02-27

## 2025-02-27 RX ORDER — NEOMYCIN SULFATE, POLYMYXIN B SULFATE, AND DEXAMETHASONE 3.5; 10000; 1 MG/G; [USP'U]/G; MG/G
OINTMENT OPHTHALMIC
Status: DISCONTINUED
Start: 2025-02-27 | End: 2025-02-27 | Stop reason: HOSPADM

## 2025-02-27 RX ADMIN — FENTANYL CITRATE 50 MCG: 50 INJECTION, SOLUTION INTRAMUSCULAR; INTRAVENOUS at 07:02

## 2025-02-27 RX ADMIN — PHENYLEPHRINE HYDROCHLORIDE 100 MCG: 10 INJECTION INTRAVENOUS at 07:02

## 2025-02-27 RX ADMIN — HYDROMORPHONE HYDROCHLORIDE 0.2 MG: 1 INJECTION, SOLUTION INTRAMUSCULAR; INTRAVENOUS; SUBCUTANEOUS at 09:02

## 2025-02-27 RX ADMIN — DEXMEDETOMIDINE 4 MCG: 100 INJECTION, SOLUTION, CONCENTRATE INTRAVENOUS at 08:02

## 2025-02-27 RX ADMIN — LIDOCAINE HYDROCHLORIDE 80 MG: 20 INJECTION, SOLUTION EPIDURAL; INFILTRATION; INTRACAUDAL; PERINEURAL at 07:02

## 2025-02-27 RX ADMIN — MIDAZOLAM HYDROCHLORIDE 2 MG: 2 INJECTION, SOLUTION INTRAMUSCULAR; INTRAVENOUS at 07:02

## 2025-02-27 RX ADMIN — DEXAMETHASONE SODIUM PHOSPHATE 4 MG: 4 INJECTION, SOLUTION INTRAMUSCULAR; INTRAVENOUS at 07:02

## 2025-02-27 RX ADMIN — PROPOFOL 150 MG: 10 INJECTION, EMULSION INTRAVENOUS at 07:02

## 2025-02-27 RX ADMIN — DEXMEDETOMIDINE 8 MCG: 100 INJECTION, SOLUTION, CONCENTRATE INTRAVENOUS at 08:02

## 2025-02-27 RX ADMIN — SODIUM CHLORIDE: 9 INJECTION, SOLUTION INTRAVENOUS at 07:02

## 2025-02-27 NOTE — DISCHARGE SUMMARY
Discharge Summary  Ophthalmology Service      Admit Date: 2/27/2025     Discharge Date: 2/27/2025     Attending Physician: Leonardo Snyder MD     Discharge Physician: Leonardo Snyder MD    Discharged Condition: Good    Reason for Admission: Hypertropia of left eye [H50.22]     Treatments/Procedures: Strabismus Surgery (see dictated report for details).    Hospital Course: Stable, dictated    Consults: None    Significant Diagnostic Studies: None    Disposition: Home    Patient Instructions:   - Resume same diet as prior to surgery  - Resume activity as tolerated with no swimming for 1 week  - Start Maxitrol ointment three times per day for 7 days   - Apply ice packs to surgical eye(s) for 72 hours as tolerated  - Call the Ophthalmology clinic to schedule a follow-up appointment with Dr. Snyder     Patient Instructions:   Current Discharge Medication List        START taking these medications    Details   traMADoL (ULTRAM) 50 mg tablet Take 1 tablet (50 mg total) by mouth every 6 (six) hours as needed for Pain.  Qty: 12 tablet, Refills: 0    Comments: Quantity prescribed more than 7 day supply? No  Associated Diagnoses: Hypertropia of left eye           CONTINUE these medications which have NOT CHANGED    Details   amLODIPine (NORVASC) 5 MG tablet Take 1 tablet by mouth once daily  Qty: 90 tablet, Refills: 0    Comments: .  Associated Diagnoses: Hypertension associated with type 2 diabetes mellitus      aspirin (ECOTRIN) 81 MG EC tablet Take 81 mg by mouth once daily.      cholecalciferol, vitamin D3, 125 mcg (5,000 unit) capsule Take 5,000 Units by mouth once daily.      coenzyme Q10 100 mg capsule Take 100 mg by mouth once daily.      indapamide (LOZOL) 1.25 MG Tab Take 1 tablet by mouth once daily  Qty: 90 tablet, Refills: 0    Associated Diagnoses: Hypertension, unspecified type      levothyroxine (SYNTHROID) 200 MCG tablet TAKE 1 TABLET BY MOUTH ONCE DAILY BEFORE BREAKFAST  Qty: 90 tablet, Refills: 2    Associated  Diagnoses: Hypothyroidism, unspecified type      lisinopriL (PRINIVIL,ZESTRIL) 5 MG tablet Take 1 tablet by mouth once daily  Qty: 90 tablet, Refills: 0    Comments: .  Associated Diagnoses: Hypertension, unspecified type      pitavastatin calcium (LIVALO) 4 mg Tab TAKE 1 TABLET BY MOUTH ONCE DAILY IN THE EVENING  Qty: 90 tablet, Refills: 2    Associated Diagnoses: Hyperlipidemia, unspecified hyperlipidemia type      semaglutide (OZEMPIC) 0.25 mg or 0.5 mg (2 mg/3 mL) pen injector INJECT 0.5 MG INTO THE SKIN EVERY 7 DAYS  Qty: 3 mL, Refills: 5    Associated Diagnoses: Type 2 diabetes mellitus with hyperglycemia, without long-term current use of insulin             No discharge procedures on file.    Leonardo Snyder MD  Pediatric Ophthalmology and Adult Strabismus  [facility]

## 2025-02-27 NOTE — LETTER
February 27, 2025         [address]  [facility] [address]  Phone: [phone]  Fax: [phone]       Patient: Gavin Lafleur   YOB: 1965  Date of Visit: 02/27/2025    To Whom It May Concern:    Carola Lafleur  was at [facility] on 02/27/2025. The patient may return to work/school on 03/03/2025 with restrictions. If you have any questions or concerns, or if I can be of further assistance, please do not hesitate to contact Dr. Snyder office at [phone].    Sincerely,    [facility]

## 2025-02-27 NOTE — DISCHARGE INSTRUCTIONS
ACTIVITY LEVEL:  If you received sedation or an anesthetic, you may feel sleepy for several hours. Rest until you are more awake. Gradually resume your normal activities in two days. Children may return to school in 3-4 days. It is all right to watch television or to read. Swimming is permitted in two weeks.    CARE OF INCISION:  A blood-tinged discharge from the eye is normal. This can be gently washed away with a clean, damp wash cloth. Do not use water, gauze, or cotton to wipe the lids. The morning after surgery, you may have difficulty opening your eyes. This is normal. If dry blood or secretions are holding the lids together, you may open the eyes by gently  the lids from above and below. Please wash your hands thoroughly before doing this. If the lids dont open, do not force them apart. (A child will cry and the tears will soften the secretions and a parent can try again later.) Use cool compresses to the eyes for 24 hours, if tolerated for comfort. Place maxitrol ointment in eyes three times per day for 7 days     BATHING:  Keep your face out of water for seven days after surgery    DIET:  At home, continue with liquids, and if there is no nausea, you may eat a soft diet. Gradually resume your normal diet.    PAIN:  If needed for discomfort, you can use cold compresses and take Tylenol (usual recommended dose) every four hours. Generally, do not take Tylenol more than four times a day.  If you were prescribed a narcotic, you may take as prescribed.     WHEN TO CALL THE DOCTOR:   Any increase in the amount of swelling of the eyes and adjacent tissues   Heavy yellow discharge from the eyes   Fever over 101ºF (38.4ºC)    A purple discoloration of the lower lids is common. It appears a few days after surgery and does not affect healing. You may experience double vision after surgery. This is normal and should disappear in a few days or weeks. Prescription glasses may be worn unless otherwise  instructed. The eyes may be unusually sensitive to light for several days. Dark sunglasses will help.    FOR EMERGENCIES:  If any unusual problems or difficulties occur, contact Dr. Snyder or the resident at [phone] (page ) or at the Clinic office, [phone].

## 2025-02-27 NOTE — ANESTHESIA PREPROCEDURE EVALUATION
02/27/2025  Gavin Lafleur is a 59 y.o., male.    Pre-operative evaluation for Procedure(s) (LRB):  STRABISMUS SURGERY (Left)      Encounter Diagnosis   Name Primary?    Hypertropia of left eye Yes       Review of patient's allergies indicates:   Allergen Reactions    Percocet [oxycodone-acetaminophen] Hives       Medications Prior to Admission   Medication Sig Dispense Refill Last Dose/Taking    amLODIPine (NORVASC) 5 MG tablet Take 1 tablet by mouth once daily 90 tablet 0 2/27/2025 Morning    aspirin (ECOTRIN) 81 MG EC tablet Take 81 mg by mouth once daily.   2/25/2025    cholecalciferol, vitamin D3, 125 mcg (5,000 unit) capsule Take 5,000 Units by mouth once daily.   2/25/2025    coenzyme Q10 100 mg capsule Take 100 mg by mouth once daily.   2/25/2025    indapamide (LOZOL) 1.25 MG Tab Take 1 tablet by mouth once daily 90 tablet 0 2/25/2025    levothyroxine (SYNTHROID) 200 MCG tablet TAKE 1 TABLET BY MOUTH ONCE DAILY BEFORE BREAKFAST 90 tablet 2 2/26/2025    lisinopriL (PRINIVIL,ZESTRIL) 5 MG tablet Take 1 tablet by mouth once daily 90 tablet 0 2/25/2025    pitavastatin calcium (LIVALO) 4 mg Tab TAKE 1 TABLET BY MOUTH ONCE DAILY IN THE EVENING 90 tablet 2 2/26/2025    semaglutide (OZEMPIC) 0.25 mg or 0.5 mg (2 mg/3 mL) pen injector INJECT 0.5 MG INTO THE SKIN EVERY 7 DAYS 3 mL 5 2/17/2025         0.9% NaCl   Intravenous Continuous           No current facility-administered medications on file prior to encounter.     Current Outpatient Medications on File Prior to Encounter   Medication Sig Dispense Refill    amLODIPine (NORVASC) 5 MG tablet Take 1 tablet by mouth once daily 90 tablet 0    aspirin (ECOTRIN) 81 MG EC tablet Take 81 mg by mouth once daily.      cholecalciferol, vitamin D3, 125 mcg (5,000 unit) capsule Take 5,000 Units by mouth once daily.      coenzyme Q10 100 mg capsule Take 100 mg by mouth  "once daily.      levothyroxine (SYNTHROID) 200 MCG tablet TAKE 1 TABLET BY MOUTH ONCE DAILY BEFORE BREAKFAST 90 tablet 2    lisinopriL (PRINIVIL,ZESTRIL) 5 MG tablet Take 1 tablet by mouth once daily 90 tablet 0    pitavastatin calcium (LIVALO) 4 mg Tab TAKE 1 TABLET BY MOUTH ONCE DAILY IN THE EVENING 90 tablet 2    semaglutide (OZEMPIC) 0.25 mg or 0.5 mg (2 mg/3 mL) pen injector INJECT 0.5 MG INTO THE SKIN EVERY 7 DAYS 3 mL 5       Past Medical History:  No date: Hyperlipidemia  No date: Hypothyroidism  No date: IFG (impaired fasting glucose)  No date: LARES (nonalcoholic steatohepatitis)  No date: Occlusion and stenosis of right carotid artery  No date: Umbilical hernia    Past Surgical History:   Procedure Laterality Date    ADENOIDECTOMY      BICEPS TENDON REPAIR      KNEE SURGERY Left     ORBITAL FRACTURE SURGERY Left 2022    TONSILLECTOMY      TYMPANOSTOMY TUBE PLACEMENT      UMBILICAL HERNIA REPAIR  2019       Tobacco Use History[1]    Social History     Substance and Sexual Activity   Alcohol Use Yes    Alcohol/week: 24.0 standard drinks of alcohol    Types: 24 Cans of beer per week    Comment: light       Physical Activity: Not on file       No birth history on file.  No results for input(s): "HCT" in the last 72 hours.  No results for input(s): "PLT" in the last 72 hours.  No results for input(s): "K" in the last 72 hours.  No results for input(s): "CREATININE" in the last 72 hours.  No results for input(s): "GLU" in the last 72 hours.  No results for input(s): "PT" in the last 72 hours.  Vitals:    02/27/25 0559 02/27/25 0601   BP:  (!) 141/72   BP Location:  Left arm   Patient Position:  Lying   Pulse: 90 93   Resp:  18   SpO2:  95%   Weight:  81.2 kg (179 lb 0.2 oz)   Height:  5' 6" (1.676 m)                       Pre-op Assessment          Review of Systems  Anesthesia Hx:  No problems with previous Anesthesia                Hematology/Oncology:  Hematology Normal   Oncology Normal                       "             Cardiovascular:     Hypertension, well controlled   Denies MI.        Denies Angina.                                    Pulmonary:  Pulmonary Normal   Denies COPD.  Denies Asthma.   Denies Shortness of breath.                  Renal/:   Denies Chronic Renal Disease.                Hepatic/GI:      Denies Liver Disease.   Taking GLP-1 Agonists Instructed to Hold for 7 Days No Reported GI Symptoms          Neurological:  Denies TIA.  Denies CVA.    Denies Seizures.                                Endocrine:  Denies Diabetes.               Physical Exam  General: Well nourished, Cooperative, Alert and Oriented    Airway:  Mallampati: II   Mouth Opening: Normal  TM Distance: Normal  Tongue: Normal  Neck ROM: Normal ROM        Anesthesia Plan  Type of Anesthesia, risks & benefits discussed:    Anesthesia Type: Gen ETT  Intra-op Monitoring Plan: Standard ASA Monitors  Post Op Pain Control Plan: multimodal analgesia and IV/PO Opioids PRN  Induction:  IV  Informed Consent: Informed consent signed with the Patient and all parties understand the risks and agree with anesthesia plan.  All questions answered.   ASA Score: 2  Day of Surgery Review of History & Physical: H&P Update referred to the surgeon/provider.    Ready For Surgery From Anesthesia Perspective.     .           [1]   Social History  Tobacco Use   Smoking Status Every Day   Smokeless Tobacco Not on file

## 2025-02-27 NOTE — ANESTHESIA PROCEDURE NOTES
Intubation    Date/Time: 2/27/2025 7:21 AM    Performed by: Shelli Panchal CRNA  Authorized by: Poli Mcnamara Jr., MD    Intubation:     Induction:  Intravenous    Intubated:  Postinduction    Mask Ventilation:  Easy mask    Attempts:  1    Attempted By:  CRNA    Difficult Airway Encountered?: No      Complications:  None    Airway Device:  Supraglottic airway/LMA    Airway Device Size:  4.0    Style/Cuff Inflation:  Cuffed (inflated to minimal occlusive pressure)    Placement Verified By:  Capnometry    Complicating Factors:  None    Findings Post-Intubation:  BS equal bilateral

## 2025-02-27 NOTE — H&P
"Ophthalmology History and Physical     Patient Name:  Gavin Lafleur  MRN:  [id number]  :  1965    Allergies:  Percocet [oxycodone-acetaminophen]    CC:  Here for Surgery    HPI:  Patient presenting for strabismus surgery.    Interval History: No significant changes to past medical history, allergies, or medications.    ROS:  No fevers, chills, chest pain, shortness of breath, nausea or emesis         Past Medical History:   Diagnosis Date    Hyperlipidemia     Hypothyroidism     IFG (impaired fasting glucose)     LARES (nonalcoholic steatohepatitis)     Occlusion and stenosis of right carotid artery     Umbilical hernia      Family History   Problem Relation Name Age of Onset    Cataracts Mother      Glaucoma Mother      Hypertension Mother      Anxiety disorder Mother      Cancer Father      Diabetes Father      Hypertension Father      Diabetes type II Father      Hyperlipidemia Father      Coronary artery disease Father          with CABG    Skin cancer Father      Stroke Sister      Diabetes Brother      Diabetes type II Brother      Heart disease Brother      Cancer Maternal Aunt      Cancer Maternal Uncle       Past Surgical History:   Procedure Laterality Date    ADENOIDECTOMY      BICEPS TENDON REPAIR      KNEE SURGERY Left     ORBITAL FRACTURE SURGERY Left     TONSILLECTOMY      TYMPANOSTOMY TUBE PLACEMENT      UMBILICAL HERNIA REPAIR         Medications marked as taking:  [unfilled]    Vital Signs:  BP (!) 141/72 (BP Location: Left arm, Patient Position: Lying)   Pulse 93   Resp 18   Ht 5' 6" (1.676 m)   Wt 81.2 kg (179 lb 0.2 oz)   SpO2 95%   BMI 28.89 kg/m²     Ophthalmology Exam:  Per last ophthalmology clinic note    Heart Exam: As per anesthesia    Lung Exam:  As per anesthesia    Assessment and Plan:     Gavin Lafleur is a 59 y.o. male presenting for strabismus surgery, both eye (will determine if need to do any surgery on right eye pending intra-op findings of left " eye)    -Written consent present     -Plan to proceed to OR as planned      Leonardo Snyder MD  Pediatric Ophthalmology and Adult Strabismus  [facility]

## 2025-02-27 NOTE — PROGRESS NOTES
Recovery care complete. Pt tolerated well. Discharge instructions and handouts provided. Pt verbalized understanding. Pt given PO fluids and tolerated well. Pt in NAD, VSS. Pt discharge home to self care.

## 2025-02-27 NOTE — OP NOTE
Patient Name: Gavin Lafleur  Date of Surgery: 25  Medical Record Number: [id number]  : 1965    Surgeon: Leonardo Snyder MD  Assistant: Lm Kaiser MD    Pre-op Diagnosis:  1. Hypertropia, left eye  2. History of orbital floor fracture with repair, left eye    Post-op Diagnosis:  1. Hypertropia, left eye  2. History of orbital floor fracture with repair, left eye    Procedure:  1. Left superior rectus recession of 9 mm  2. Left inferior rectus resection, 5mm with lysis of adhesions/scar tissue    Anesthesia: General  Complications: none    INDICATION OF PROCEDURE  Gavin Lafleur is a 59 y.o. male with history of left hypertropia after a remote orbital floor fracture repaired in . The strabismus has become progressively more difficult to control. The patient was identified in the main operating room holding area. The patient's parents were advised of the risks and benefits of surgical intervention, elected to proceed, and signed an informed consent document.    DESCRIPTION OF PROCEDURE:  The patient was identified in the preoperative holding area and brought to the operating room where anesthesia monitoring was established and general anesthesia induced in the supine position. The area about each eye was then prepped and draped in the usual sterile fashion. A drop of 2.5% phenylephrine was applied to each eye.    Forced ductions were performed on both eyes and showed restriction to infraduction on the left eye.  Attention was turned to the left eye and a speculum was placed. 6-0 silk traction sutures were placed at the limbus on each eye. The globe was rotated inferiorly and a 90 degree conjunctival limbal peritomy was created superiorly using Sebastian scissors. A Barnhart hook, followed by a Green hook, were used to engage the left superior rectus muscle. The conjunctiva was lifted over the toe of the hook to expose the muscle insertion. Tenon's attachments were severed with Sebastian scissors. A  double-armed suture of 6-0 Vicryl was passed through the muscle tendon near its insertion with locking bites at both poles. The muscle was then severed from the globe with Sebastian scissors. Locking forceps were applied to both poles of the original muscle insertion and the globe positioned in infraduction. Then the attachments (frenulum) to the superior oblique were severed without complications. The Vicryl suture arms were passed at one-half scleral depth at the original muscle. Then, the muscle was pulled up to the insertion and a 2-1-1 knot was fashioned on the pole suture arms 9mm anteriorly. The superior rectus was then allowed to hang back 9mm to complete the recession. The muscle was found to be stable in its new position. The conjunctiva was closed with interrupted sutures of 6-0 plain gut.    Then, forced ductions were performed again and showed improved restriction to downgaze but still some mild restriction. The decision was made to perform a left inferior rectus resection and dissect any scar tissue.    A 90 degree limbal peritomy was created in the conjunctiva inferiorly. Dissection was carried out under Tenon's capsule in the inferior-nasal and inferior-temporal quadrants to reveal bare sclera. Some scarring from previous orbital surgery was encountered and this was carefully dissected away. A Barnhart hook, followed by a Green hook, were used to engage the left inferior rectus muscle. The conjunctiva was lifted over the toe of the hook to expose the muscle insertion. Tenon's attachments were severed with Sebastian scissors. Posterior dissection of Tenons capsule and overlying tissue was carried out posteriorly to release adhesions of the inferior rectus to the lower eyelid retractors. Then, calipers were used to trixie the muscle 5mm posterior to its insertion. A double-armed suture of 6-0 Vicryl was passed through the muscle at the 5mm trixie with locking bites at both poles. A straight clamp hemostat was  placed anterior to the sutures and the muscle was then severed from the globe with Sebastian scissors. The anterior muscle stump was cut away from the globe and locking forceps were applied to both poles of the original muscle insertion and the globe positioned in elevation. The Vicryl suture arms were passed at one-half scleral depth at the original muscle insertion. The muscle was brought forward to complete the resection. The muscle was tied down to the globe and found to be stable in its new position. The conjunctiva was closed with interrupted sutures of 6-0 plain gut.    The eyelid speculum was then removed. A drop of dilute Betadine solution was placed in left eye followed by Maxitrol ophthalmic ointment. The patient was awakened from anesthesia and taken to the postoperative recovery area in stable condition, having tolerated the procedure well.    Dr. Snyder was present for and scrubbed for the entire case.

## 2025-02-27 NOTE — ANESTHESIA POSTPROCEDURE EVALUATION
Anesthesia Post Evaluation    Patient: Gavin Lafleur    Procedure(s) Performed: Procedure(s) (LRB):  STRABISMUS SURGERY (Left)    Final Anesthesia Type: general      Patient location during evaluation: PACU  Patient participation: Yes- Able to Participate  Level of consciousness: awake and alert and oriented  Post-procedure vital signs: reviewed and stable  Pain management: adequate  Airway patency: patent    PONV status at discharge: No PONV  Anesthetic complications: no      Cardiovascular status: stable  Respiratory status: unassisted, spontaneous ventilation and room air  Hydration status: euvolemic  Follow-up not needed.              Vitals Value Taken Time   /91 02/27/25 09:28   Temp 36.2 °C (97.1 °F) 02/27/25 08:45   Pulse 90 02/27/25 09:28   Resp 22 02/27/25 09:28   SpO2 98 % 02/27/25 09:28   Vitals shown include unfiled device data.      No case tracking events are documented in the log.      Pain/Damon Score: Pain Rating Prior to Med Admin: 7 (2/27/2025  9:21 AM)  Damon Score: 10 (2/27/2025  9:15 AM)

## 2025-02-27 NOTE — TRANSFER OF CARE
"Anesthesia Transfer of Care Note    Patient: Gavin Lafleur    Procedure(s) Performed: Procedure(s) (LRB):  STRABISMUS SURGERY (Left)    Patient location: PACU    Anesthesia Type: general    Transport from OR: Transported from OR on 6-10 L/min O2 by face mask with adequate spontaneous ventilation    Post pain: adequate analgesia    Post assessment: no apparent anesthetic complications    Post vital signs: stable    Level of consciousness: awake and sedated    Nausea/Vomiting: no nausea/vomiting    Complications: none    Transfer of care protocol was followed      Last vitals: Visit Vitals  BP (!) 141/72 (BP Location: Left arm, Patient Position: Lying)   Pulse 93   Resp 18   Ht 5' 6" (1.676 m)   Wt 81.2 kg (179 lb 0.2 oz)   SpO2 95%   BMI 28.89 kg/m²     "

## 2025-02-28 LAB — POCT GLUCOSE: 127 MG/DL (ref 70–110)

## 2025-03-03 ENCOUNTER — TELEPHONE (OUTPATIENT)
Dept: OPHTHALMOLOGY | Facility: CLINIC | Age: 60
End: 2025-03-03
Payer: COMMERCIAL

## 2025-03-03 NOTE — LETTER
March 3, 2025      [facility]  [address]  [facility] [address]  Phone: [phone]  Fax: [phone]       Patient: Gavin Lafleur   YOB: 1965  Date of Visit: 03/03/2025    To Whom It May Concern:    Carola Lafleur  was at underwent eye surgery at [facility] on Thursday, Feb 27 2025. The patient may return to work/school on Wednesday, March 5 2025. Only restriction will be he should wear protective eye gear at all times.   He has a follow up to see me on March 11, 2025. If you have any questions or concerns, or if I can be of further assistance, please do not hesitate to contact me.    Sincerely,    Leonardo Snyder MD

## 2025-03-03 NOTE — TELEPHONE ENCOUNTER
Called and spoke with patient. Doing well after strabismus surgery. I advised to continue maxitrol ointment TID through Thursday, then can taper to once nightly. Can resume normal activities and return to work Wednesday. I will send a letter to portal per patient request.    Leonardo Snyder MD  Pediatric Ophthalmology and Adult Strabismus  [facility]

## 2025-03-11 ENCOUNTER — OFFICE VISIT (OUTPATIENT)
Dept: OPHTHALMOLOGY | Facility: CLINIC | Age: 60
End: 2025-03-11
Payer: COMMERCIAL

## 2025-03-11 DIAGNOSIS — H50.22 HYPERTROPIA OF LEFT EYE: Primary | ICD-10-CM

## 2025-03-11 PROCEDURE — 1160F RVW MEDS BY RX/DR IN RCRD: CPT | Mod: CPTII,S$GLB,, | Performed by: STUDENT IN AN ORGANIZED HEALTH CARE EDUCATION/TRAINING PROGRAM

## 2025-03-11 PROCEDURE — 3060F POS MICROALBUMINURIA REV: CPT | Mod: CPTII,S$GLB,, | Performed by: STUDENT IN AN ORGANIZED HEALTH CARE EDUCATION/TRAINING PROGRAM

## 2025-03-11 PROCEDURE — 1159F MED LIST DOCD IN RCRD: CPT | Mod: CPTII,S$GLB,, | Performed by: STUDENT IN AN ORGANIZED HEALTH CARE EDUCATION/TRAINING PROGRAM

## 2025-03-11 PROCEDURE — 3066F NEPHROPATHY DOC TX: CPT | Mod: CPTII,S$GLB,, | Performed by: STUDENT IN AN ORGANIZED HEALTH CARE EDUCATION/TRAINING PROGRAM

## 2025-03-11 PROCEDURE — 99024 POSTOP FOLLOW-UP VISIT: CPT | Mod: S$GLB,,, | Performed by: STUDENT IN AN ORGANIZED HEALTH CARE EDUCATION/TRAINING PROGRAM

## 2025-03-11 PROCEDURE — 4010F ACE/ARB THERAPY RXD/TAKEN: CPT | Mod: CPTII,S$GLB,, | Performed by: STUDENT IN AN ORGANIZED HEALTH CARE EDUCATION/TRAINING PROGRAM

## 2025-03-11 PROCEDURE — 99999 PR PBB SHADOW E&M-EST. PATIENT-LVL III: CPT | Mod: PBBFAC,,, | Performed by: STUDENT IN AN ORGANIZED HEALTH CARE EDUCATION/TRAINING PROGRAM

## 2025-03-11 PROCEDURE — 3044F HG A1C LEVEL LT 7.0%: CPT | Mod: CPTII,S$GLB,, | Performed by: STUDENT IN AN ORGANIZED HEALTH CARE EDUCATION/TRAINING PROGRAM

## 2025-03-11 NOTE — ASSESSMENT & PLAN NOTE
Injury sustain to left orbit on 2/2/22   CT Orbits: 1. Mild fat stranding within the inferior retrobulbar left orbit, possibly inflammatory or posttraumatic.  2. Deformity left orbital floor, possibly related to old fracture.  3. Moderate paranasal sinus disease.    11/21/22 - S/p fracture repair with plate with Dr. Gaming    3/20/23: Seen with Dr. Molina with large left hypertropia - planned for surgery but postponed duet family issues     1/6/25: Patient bothered by large degree of strabismus. Has double vision and interferes with daily interactions.  Exam with large left hypertropia. Poor infraduction left eye but only mildly reduced saccades     2/27/25 - strab surgery  Intra-op forced ductions positive on downgaze but improved after taking SR off --> LSRc 9mm and LIR resection 5mm    POW2: Improved but significant undercorrection with poor downgaze  Possible still with posterior adherence?  Plan:  Taper maxitorl to once daily for one week then stop  RTC 1 month - consider more surgery at that time pending patient symptoms

## 2025-03-11 NOTE — LETTER
March 11, 2025      [facility] [address]  [address]  [facility] [address]  Phone: [phone]  Fax: [phone]       Patient: Gavin Lafleur   YOB: 1965  Date of Visit: 03/11/2025    To Whom It May Concern:    Carola Lafleur  was at [facility] on 03/11/2025. The patient may return to work on 03/12/2025 with no restrictions. If you have any questions or concerns, or if I can be of further assistance, please do not hesitate to contact me.    Sincerely,    Leonardo Snyder MD

## 2025-03-11 NOTE — PROGRESS NOTES
HPI    Gavin Lafleur is a 59 y.o. male who returns for his 1 week post op eval.     S/p surgery Left superior rectus recession of 9 mm and Left inferior   rectus resection, 5mm with lysis of adhesions/scar tissue.  Today, Patient is happy with surgical results. He reports improvement in   diplopia and alignment. He still notice it deviating upward. He patch his   right eye and walked around with his left eye.    Eye meds: Maxitrol TID OS                    Refresh OU PRN        Last edited by Leonardo Snyder MD on 3/11/2025  4:35 PM.        ROS    Negative for: Constitutional, Gastrointestinal, Neurological, Skin,   Genitourinary, Musculoskeletal, HENT, Endocrine, Cardiovascular, Eyes,   Respiratory, Psychiatric, Allergic/Imm, Heme/Lymph  Last edited by Leonardo Snyder MD on 3/11/2025  4:35 PM.        Assessment /Plan     For exam results, see Encounter Report.    Hypertropia of left eye        Problem List Items Addressed This Visit          Ophtho    Hypertropia of left eye - Primary    Current Assessment & Plan   Injury sustain to left orbit on 2/2/22   CT Orbits: 1. Mild fat stranding within the inferior retrobulbar left orbit, possibly inflammatory or posttraumatic.  2. Deformity left orbital floor, possibly related to old fracture.  3. Moderate paranasal sinus disease.    11/21/22 - S/p fracture repair with plate with Dr. Gaming    3/20/23: Seen with Dr. Molina with large left hypertropia - planned for surgery but postponed duet family issues     1/6/25: Patient bothered by large degree of strabismus. Has double vision and interferes with daily interactions.  Exam with large left hypertropia. Poor infraduction left eye but only mildly reduced saccades     2/27/25 - strab surgery  Intra-op forced ductions positive on downgaze but improved after taking SR off --> LSRc 9mm and LIR resection 5mm    POW2: Improved but significant undercorrection with poor downgaze  Possible still with posterior  adherence?  Plan:  Taper maxitorl to once daily for one week then stop  RTC 1 month - consider more surgery at that time pending patient symptoms            Leonardo Snyder MD  Pediatric Ophthalmology and Adult Strabismus  [facility]\

## 2025-03-27 ENCOUNTER — OFFICE VISIT (OUTPATIENT)
Dept: OPHTHALMOLOGY | Facility: CLINIC | Age: 60
End: 2025-03-27
Payer: COMMERCIAL

## 2025-03-27 DIAGNOSIS — H50.22 HYPERTROPIA OF LEFT EYE: Primary | ICD-10-CM

## 2025-03-27 PROCEDURE — 99999 PR PBB SHADOW E&M-EST. PATIENT-LVL III: CPT | Mod: PBBFAC,,, | Performed by: STUDENT IN AN ORGANIZED HEALTH CARE EDUCATION/TRAINING PROGRAM

## 2025-03-27 NOTE — ASSESSMENT & PLAN NOTE
Injury sustain to left orbit on 2/2/22   CT Orbits: 1. Mild fat stranding within the inferior retrobulbar left orbit, possibly inflammatory or posttraumatic.  2. Deformity left orbital floor, possibly related to old fracture.  3. Moderate paranasal sinus disease.    11/21/22 - S/p fracture repair with plate with Dr. Gaming    3/20/23: Seen with Dr. Molina with large left hypertropia - planned for surgery but postponed duet family issues     1/6/25: Patient bothered by large degree of strabismus. Has double vision and interferes with daily interactions.  Exam with large left hypertropia. Poor infraduction left eye but only mildly reduced saccades     2/27/25 - strab surgery  Intra-op forced ductions positive on downgaze but improved after taking SR off --> LSRc 9mm and LIR resection 5mm    POM1: Improved but significant undercorrection and still poor downgaze (worse in abduction)  Patient reports continued double vision and visible eye misalignment interfering with work and desires surgery  Residual angle not amenable to prism, will plan for further surgery    Plan:  Discussed risks and benefits of strabismus surgery  Benefits include: realign eyes and improved binocularity  Risks include: Pain, bleeding infection, transient or permanent redness, less attractive appearance, decreased vision, loss of vision, undercorrection, overcorrection, double vision, need for future surgery  Patient elects to proceed    Will plan for possible RLRc, RIRc, and L IO anteriorization   - Will discuss with surgical colleagues

## 2025-03-27 NOTE — PROGRESS NOTES
HEMAL Lafleur is a 59 y.o. male who returns for 1 month post op. Today,   patient states no improvement. He still notice when looking straight his   right eye drifting upward and diplopia. He sometimes patches an eye but   for the most part just lives with the diplopia.    Eye meds: Refresh OU PRN    Last edited by Leonardo Snyder MD on 3/27/2025  3:29 PM.        ROS    Negative for: Constitutional, Gastrointestinal, Neurological, Skin,   Genitourinary, Musculoskeletal, HENT, Endocrine, Cardiovascular, Eyes,   Respiratory, Psychiatric, Allergic/Imm, Heme/Lymph  Last edited by Leonardo Snyder MD on 3/27/2025  3:29 PM.        Assessment /Plan     For exam results, see Encounter Report.    Hypertropia of left eye        Problem List Items Addressed This Visit          Ophtho    Hypertropia of left eye - Primary    Current Assessment & Plan   Injury sustain to left orbit on 2/2/22   CT Orbits: 1. Mild fat stranding within the inferior retrobulbar left orbit, possibly inflammatory or posttraumatic.  2. Deformity left orbital floor, possibly related to old fracture.  3. Moderate paranasal sinus disease.    11/21/22 - S/p fracture repair with plate with Dr. Gaming    3/20/23: Seen with Dr. Molina with large left hypertropia - planned for surgery but postponed duet family issues     1/6/25: Patient bothered by large degree of strabismus. Has double vision and interferes with daily interactions.  Exam with large left hypertropia. Poor infraduction left eye but only mildly reduced saccades     2/27/25 - strab surgery  Intra-op forced ductions positive on downgaze but improved after taking SR off --> LSRc 9mm and LIR resection 5mm    POM1: Improved but significant undercorrection and still poor downgaze (worse in abduction)  Patient reports continued double vision and visible eye misalignment interfering with work and desires surgery  Residual angle not amenable to prism, will plan for further  surgery    Plan:  Discussed risks and benefits of strabismus surgery  Benefits include: realign eyes and improved binocularity  Risks include: Pain, bleeding infection, transient or permanent redness, less attractive appearance, decreased vision, loss of vision, undercorrection, overcorrection, double vision, need for future surgery  Patient elects to proceed    Will plan for possible RLRc, RIRc, and L IO anteriorization   - Will discuss with surgical colleagues           Leonardo Snyder MD  Pediatric Ophthalmology and Adult Strabismus  [facility]

## 2025-03-28 ENCOUNTER — TELEPHONE (OUTPATIENT)
Dept: OPHTHALMOLOGY | Facility: CLINIC | Age: 60
End: 2025-03-28
Payer: COMMERCIAL

## 2025-03-28 NOTE — TELEPHONE ENCOUNTER
Spoke to patient about scheduling surgery with . patient needs to check work schedule before scheduling surgery and also needs to confirm transportation. Patient will call back to schedule back to schedule procedure.

## 2025-04-08 ENCOUNTER — TELEPHONE (OUTPATIENT)
Dept: OPHTHALMOLOGY | Facility: CLINIC | Age: 60
End: 2025-04-08
Payer: COMMERCIAL

## 2025-04-08 DIAGNOSIS — H50.22 HYPERTROPIA OF LEFT EYE: Primary | ICD-10-CM

## 2025-04-10 ENCOUNTER — TELEPHONE (OUTPATIENT)
Dept: PHARMACY | Facility: CLINIC | Age: 60
End: 2025-04-10
Payer: COMMERCIAL

## 2025-04-10 NOTE — TELEPHONE ENCOUNTER
[facility] Chart Review & Patient Outreach Details For Medication Adherence Project    Reason for Outreach Encounter: 3rd Party payor non-compliance report ([employer], [employer], [employer], etc)  2.  Patient Outreach Method: Reviewed patient chart   3.   Medication in question:    Diabetes Medications              semaglutide (OZEMPIC) 0.25 mg or 0.5 mg (2 mg/3 mL) pen injector INJECT 0.5 MG INTO THE SKIN EVERY 7 DAYS                   last filled  4/1/25 for 28 day supply    4.  Reviewed and or Updates Made To: Patient Chart  5. Outreach Outcomes and/or actions taken: Patient filled medication and is on track to be adherent  Additional Notes:

## 2025-04-28 ENCOUNTER — TELEPHONE (OUTPATIENT)
Dept: PHARMACY | Facility: CLINIC | Age: 60
End: 2025-04-28
Payer: COMMERCIAL

## 2025-04-28 NOTE — TELEPHONE ENCOUNTER
[facility] Chart Review & Patient Outreach Details For Medication Adherence Project    Reason for Outreach Encounter: 3rd Party payor non-compliance report ([employer], [employer], [employer], etc)  2.  Patient Outreach Method: Reviewed patient chart   3.   Medication in question:    Hyperlipidemia Medications              pitavastatin calcium (LIVALO) 4 mg Tab TAKE 1 TABLET BY MOUTH ONCE DAILY IN THE EVENING                    last filled  4/15/25 for 90 day supply      4.  Reviewed and or Updates Made To: Patient Chart  5. Outreach Outcomes and/or actions taken: Patient filled medication and is on track to be adherent  Additional Notes:

## 2025-05-11 ENCOUNTER — TELEPHONE (OUTPATIENT)
Dept: PHARMACY | Facility: CLINIC | Age: 60
End: 2025-05-11
Payer: COMMERCIAL

## 2025-05-11 NOTE — TELEPHONE ENCOUNTER
[facility] Chart Review & Patient Outreach Details For Medication Adherence Project    Reason for Outreach Encounter: 3rd Party payor non-compliance report ([employer], [employer], C, etc)  2.  Patient Outreach Method: Reviewed Patient Chart  3.   Medication in question: ozempic   LAST FILLED: 4/28/25 for 28 day supply  Diabetes Medications              semaglutide (OZEMPIC) 1 mg/dose (4 mg/3 mL) Inject 1 mg into the skin every 7 days.              4.  Reviewed and or Updates Made To: Patient Chart  5. Outreach Outcomes and/or actions taken: Patient filled medication and is on track to be adherent

## 2025-06-24 ENCOUNTER — TELEPHONE (OUTPATIENT)
Dept: PHARMACY | Facility: CLINIC | Age: 60
End: 2025-06-24
Payer: COMMERCIAL

## 2025-06-24 NOTE — TELEPHONE ENCOUNTER
[facility] Chart Review & Patient Outreach Details For Medication Adherence Project    Reason for Outreach Encounter: 3rd Party payor non-compliance report ([employer], [employer], C, etc)  2.  Patient Outreach Method: Reviewed patient chart   3.   Medication in question:    Diabetes Medications              semaglutide (OZEMPIC) 1 mg/dose (4 mg/3 mL) Inject 1 mg into the skin every 7 days.                   last filled  6/16/25 for 28 day supply      4.  Reviewed and or Updates Made To: Patient Chart  5. Outreach Outcomes and/or actions taken: Patient filled medication and is on track to be adherent  Additional Notes:

## 2025-06-30 ENCOUNTER — TELEPHONE (OUTPATIENT)
Dept: OPHTHALMOLOGY | Facility: CLINIC | Age: 60
End: 2025-06-30
Payer: COMMERCIAL

## 2025-07-01 ENCOUNTER — ANESTHESIA EVENT (OUTPATIENT)
Dept: SURGERY | Facility: HOSPITAL | Age: 60
End: 2025-07-01
Payer: COMMERCIAL

## 2025-07-01 NOTE — ANESTHESIA PREPROCEDURE EVALUATION
[facility]  Anesthesia Pre-Operative Evaluation     Patient Name: Gavin Lafleur  YOB: 1965  MRN: [id number]  [facility]: [id number]      Code Status: No Order   Date of Procedure: 7/2/2025  Anesthesia: General Procedure: Procedure(s) (LRB):  STRABISMUS SURGERY (Bilateral)  Pre-Operative Diagnosis: Hypertropia of left eye [H50.22]  Proceduralist: Surgeons and Role:     * Leonardo Snyder MD - Primary          SUBJECTIVE:     Pre-operative evaluation for Procedure(s) (LRB):  STRABISMUS SURGERY (Bilateral)     07/01/2025    Gavin Lafleur is a 60 y.o. male with HTN, hypothyroidism, LARES, DM2, and carotid artery stenosis.        Patient now presents for the above procedure(s).       No current facility-administered medications for this encounter.        ________________________________________  No results found for this or any previous visit.    ________________________________________    Prev airway:     Performed by: Shelli Panchal CRNA  Authorized by: Poli Mcnamara Jr., MD    Intubation:     Induction:  Intravenous    Intubated:  Postinduction    Mask Ventilation:  Easy mask    Attempts:  1    Attempted By:  CRNA    Difficult Airway Encountered?: No      Complications:  None    Airway Device:  Supraglottic airway/LMA    Airway Device Size:  4.0    Style/Cuff Inflation:  Cuffed (inflated to minimal occlusive pressure)    Placement Verified By:  Capnometry    Complicating Factors:  None    Findings Post-Intubation:  BS equal bilateral         LDA: None documented.       Drips: None documented.      Problem List[1]    Review of patient's allergies indicates:   Allergen Reactions    Percocet [oxycodone-acetaminophen] Hives       Current Inpatient Medications:       Medications Ordered Prior to Encounter[2]    Past Surgical History:   Procedure Laterality Date    ADENOIDECTOMY      BICEPS TENDON REPAIR      KNEE SURGERY Left     ORBITAL FRACTURE SURGERY Left 2022     STRABISMUS SURGERY Left 2/27/2025    Procedure: STRABISMUS SURGERY;  Surgeon: Leonardo Snyder MD;  Location: [facility] OR [address] [place] [address];  Service: Ophthalmology;  Laterality: Left;    TONSILLECTOMY      TYMPANOSTOMY TUBE PLACEMENT      UMBILICAL HERNIA REPAIR  2019       Social History:  Tobacco Use: High Risk (6/30/2025)    Patient History     Smoking Tobacco Use: Every Day     Smokeless Tobacco Use: Unknown     Passive Exposure: Not on file       Alcohol Use: Not on file       OBJECTIVE:     Vital Signs Range:  BMI Readings from Last 1 Encounters:   06/30/25 28.25 kg/m²               Significant Labs:        Component Value Date/Time    WBC 8.1 02/20/2025 0743    WBC 8.31 03/12/2024 0720    HGB 16.2 02/20/2025 0743    HGB 14.4 03/12/2024 0720    HCT 48.1 02/20/2025 0743    HCT 42.5 03/12/2024 0720     02/20/2025 0743     03/12/2024 0720     06/12/2025 0633    K 4.4 06/12/2025 0633    CL 99 06/12/2025 0633    CO2 21 06/12/2025 0633     (H) 06/12/2025 0633    BUN 12 06/12/2025 0633    CREATININE 0.72 (L) 06/12/2025 0633    MG 2.3 05/05/2023 0641    CALCIUM 9.5 06/12/2025 0633    ALBUMIN 4.7 06/12/2025 0633    ALBUMIN 4.0 03/12/2024 0720    PROT 7.7 03/12/2024 0720    ALKPHOS 68 03/12/2024 0720    BILITOT 0.5 06/12/2025 0633    AST 24 06/12/2025 0633    ALT 32 06/12/2025 0633    HGBA1C 6.2 (H) 06/12/2025 0633    HGBA1C 5.8 (H) 03/12/2024 0720        Please see Results Review for additional labs.     Diagnostic Studies: No relevant studies.    EKG:   No results found for this or any previous visit.    ECHO:  See subjective, if available.      ASSESSMENT/PLAN:           Pre-op Assessment    I have reviewed the Patient Summary Reports.     I have reviewed the Nursing Notes. I have reviewed the NPO Status.   I have reviewed the Medications.     Review of Systems  Anesthesia Hx:  No problems with previous Anesthesia   History of prior surgery of interest to airway management or planning:   Previous anesthesia: General        Denies Family Hx of Anesthesia complications.     Cardiovascular:     Hypertension                                    Hypertension         Hepatic/GI:      Liver Disease, Hepatitis           Liver Disease, Hepatitis        Endocrine:  Diabetes Hypothyroidism   Diabetes                   Hypothyroidism              Physical Exam  General: Well nourished, Cooperative, Alert and Oriented    Airway:  Mallampati: I   Mouth Opening: Normal  TM Distance: Normal  Tongue: Normal  Neck ROM: Normal ROM    Dental:  Intact    Chest/Lungs:  Clear to auscultation, Normal Respiratory Rate    Heart:  Rate: Normal  Rhythm: Regular Rhythm  Sounds: Normal      Anesthesia Plan  Type of Anesthesia, risks & benefits discussed:    Anesthesia Type: Gen ETT  Intra-op Monitoring Plan: Standard ASA Monitors  Post Op Pain Control Plan: multimodal analgesia and IV/PO Opioids PRN  Induction:  IV  Airway Plan: Direct and Video, Post-Induction  Informed Consent: Informed consent signed with the Patient and all parties understand the risks and agree with anesthesia plan.  All questions answered.   ASA Score: 3  Day of Surgery Review of History & Physical: H&P Update referred to the surgeon/provider.I have interviewed and examined the patient. I have reviewed the patient's H&P dated:     Ready For Surgery From Anesthesia Perspective.     .             [1]  Patient Active Problem List  Diagnosis    Joint pain    Umbilical hernia    Hypertension    Hypothyroidism    Hyperlipidemia    LARES (nonalcoholic steatohepatitis)    Type 2 diabetes mellitus with hyperglycemia, without long-term current use of insulin    Vitamin D deficiency    Carotid artery stenosis    Wellness examination    Microalbuminuria    AR (allergic rhinitis)    Lung mass    Overweight    Preoperative clearance    Bilateral lower extremity edema    Hypertropia of left eye    Ptosis, left eyelid    Pre-op exam   [2]  No current  facility-administered medications on file prior to encounter.     Current Outpatient Medications on File Prior to Encounter   Medication Sig Dispense Refill    aspirin (ECOTRIN) 81 MG EC tablet Take 81 mg by mouth every evening.      cholecalciferol, vitamin D3, 125 mcg (5,000 unit) capsule Take 5,000 Units by mouth once daily.      coenzyme Q10 100 mg capsule Take 100 mg by mouth once daily.      pitavastatin calcium (LIVALO) 4 mg Tab TAKE 1 TABLET BY MOUTH ONCE DAILY IN THE EVENING 90 tablet 2    traMADoL (ULTRAM) 50 mg tablet Take 1 tablet (50 mg total) by mouth every 6 (six) hours as needed for Pain. 12 tablet 0

## 2025-07-01 NOTE — PRE-PROCEDURE INSTRUCTIONS
PreOp Instructions given:   - Verbal medication information (what to hold and what to take)   - NPO guidelines   NO SOLID FOOD, MILK OR MILK PRODUCTS 8 HOURS PRIOR TO SX START TIME.  YOU MAY ONLY HAVE SIPS OF WATER WITH MORNING MEDICATIONS (1) HOUR PRIOR TO ARRIVAL TO HOSPITAL.   - Arrival place directions given; time to be given the day before procedure by the   Surgeon's Office [address] [facility]  - Bathing with antibacterial soap   - Don't wear any jewelry or bring any valuables AM of surgery   - No makeup or moisturizer to face   - No perfume/cologne, powder, lotions or aftershave   Pt. verbalized understanding.   Pt denies any h/o Anesthesia/Sedation complications or side effects.

## 2025-07-02 ENCOUNTER — HOSPITAL ENCOUNTER (OUTPATIENT)
Facility: HOSPITAL | Age: 60
Discharge: HOME | End: 2025-07-02
Attending: STUDENT IN AN ORGANIZED HEALTH CARE EDUCATION/TRAINING PROGRAM | Admitting: STUDENT IN AN ORGANIZED HEALTH CARE EDUCATION/TRAINING PROGRAM
Payer: COMMERCIAL

## 2025-07-02 ENCOUNTER — ANESTHESIA (OUTPATIENT)
Dept: SURGERY | Facility: HOSPITAL | Age: 60
End: 2025-07-02
Payer: COMMERCIAL

## 2025-07-02 VITALS
BODY MASS INDEX: 27.97 KG/M2 | HEART RATE: 89 BPM | OXYGEN SATURATION: 100 % | SYSTOLIC BLOOD PRESSURE: 151 MMHG | TEMPERATURE: 98 F | RESPIRATION RATE: 20 BRPM | DIASTOLIC BLOOD PRESSURE: 83 MMHG | WEIGHT: 174 LBS | HEIGHT: 66 IN

## 2025-07-02 DIAGNOSIS — H50.22 HYPERTROPIA OF LEFT EYE: Primary | ICD-10-CM

## 2025-07-02 LAB
POCT GLUCOSE: 101 MG/DL (ref 70–110)
POCT GLUCOSE: 108 MG/DL (ref 70–110)

## 2025-07-02 PROCEDURE — 82962 GLUCOSE BLOOD TEST: CPT | Performed by: STUDENT IN AN ORGANIZED HEALTH CARE EDUCATION/TRAINING PROGRAM

## 2025-07-02 PROCEDURE — 71000044 HC DOSC ROUTINE RECOVERY FIRST HOUR: Performed by: STUDENT IN AN ORGANIZED HEALTH CARE EDUCATION/TRAINING PROGRAM

## 2025-07-02 PROCEDURE — 36000707: Performed by: STUDENT IN AN ORGANIZED HEALTH CARE EDUCATION/TRAINING PROGRAM

## 2025-07-02 PROCEDURE — 25000003 PHARM REV CODE 250: Performed by: STUDENT IN AN ORGANIZED HEALTH CARE EDUCATION/TRAINING PROGRAM

## 2025-07-02 PROCEDURE — 67332 REREVISE EYE MUSCLES ADD-ON: CPT | Mod: LT,,, | Performed by: STUDENT IN AN ORGANIZED HEALTH CARE EDUCATION/TRAINING PROGRAM

## 2025-07-02 PROCEDURE — 71000016 HC POSTOP RECOV ADDL HR: Performed by: STUDENT IN AN ORGANIZED HEALTH CARE EDUCATION/TRAINING PROGRAM

## 2025-07-02 PROCEDURE — 67335 EYE SUTURE DURING SURGERY: CPT | Mod: RT,,, | Performed by: STUDENT IN AN ORGANIZED HEALTH CARE EDUCATION/TRAINING PROGRAM

## 2025-07-02 PROCEDURE — 37000008 HC ANESTHESIA 1ST 15 MINUTES: Performed by: STUDENT IN AN ORGANIZED HEALTH CARE EDUCATION/TRAINING PROGRAM

## 2025-07-02 PROCEDURE — 25000003 PHARM REV CODE 250

## 2025-07-02 PROCEDURE — 63600175 PHARM REV CODE 636 W HCPCS

## 2025-07-02 PROCEDURE — 36000706: Performed by: STUDENT IN AN ORGANIZED HEALTH CARE EDUCATION/TRAINING PROGRAM

## 2025-07-02 PROCEDURE — 67314 REVISE EYE MUSCLE: CPT | Mod: 50,,, | Performed by: STUDENT IN AN ORGANIZED HEALTH CARE EDUCATION/TRAINING PROGRAM

## 2025-07-02 PROCEDURE — 71000015 HC POSTOP RECOV 1ST HR: Performed by: STUDENT IN AN ORGANIZED HEALTH CARE EDUCATION/TRAINING PROGRAM

## 2025-07-02 PROCEDURE — 37000009 HC ANESTHESIA EA ADD 15 MINS: Performed by: STUDENT IN AN ORGANIZED HEALTH CARE EDUCATION/TRAINING PROGRAM

## 2025-07-02 RX ORDER — DEXAMETHASONE SODIUM PHOSPHATE 4 MG/ML
INJECTION, SOLUTION INTRA-ARTICULAR; INTRALESIONAL; INTRAMUSCULAR; INTRAVENOUS; SOFT TISSUE
Status: DISCONTINUED | OUTPATIENT
Start: 2025-07-02 | End: 2025-07-02

## 2025-07-02 RX ORDER — ONDANSETRON HYDROCHLORIDE 2 MG/ML
INJECTION, SOLUTION INTRAVENOUS
Status: DISCONTINUED | OUTPATIENT
Start: 2025-07-02 | End: 2025-07-02

## 2025-07-02 RX ORDER — PROPOFOL 10 MG/ML
VIAL (ML) INTRAVENOUS
Status: DISCONTINUED | OUTPATIENT
Start: 2025-07-02 | End: 2025-07-02

## 2025-07-02 RX ORDER — PHENYLEPHRINE HYDROCHLORIDE 10 MG/ML
INJECTION INTRAVENOUS
Status: DISCONTINUED | OUTPATIENT
Start: 2025-07-02 | End: 2025-07-02

## 2025-07-02 RX ORDER — HALOPERIDOL LACTATE 5 MG/ML
0.5 INJECTION, SOLUTION INTRAMUSCULAR EVERY 10 MIN PRN
Status: DISCONTINUED | OUTPATIENT
Start: 2025-07-02 | End: 2025-07-02 | Stop reason: HOSPADM

## 2025-07-02 RX ORDER — FENTANYL CITRATE 50 UG/ML
25 INJECTION, SOLUTION INTRAMUSCULAR; INTRAVENOUS EVERY 5 MIN PRN
Status: DISCONTINUED | OUTPATIENT
Start: 2025-07-02 | End: 2025-07-02 | Stop reason: HOSPADM

## 2025-07-02 RX ORDER — LIDOCAINE HYDROCHLORIDE 20 MG/ML
INJECTION INTRAVENOUS
Status: DISCONTINUED | OUTPATIENT
Start: 2025-07-02 | End: 2025-07-02

## 2025-07-02 RX ORDER — GLUCAGON 1 MG
1 KIT INJECTION
Status: DISCONTINUED | OUTPATIENT
Start: 2025-07-02 | End: 2025-07-02 | Stop reason: HOSPADM

## 2025-07-02 RX ORDER — PHENYLEPHRINE HYDROCHLORIDE 25 MG/ML
SOLUTION/ DROPS OPHTHALMIC
Status: DISCONTINUED | OUTPATIENT
Start: 2025-07-02 | End: 2025-07-02 | Stop reason: HOSPADM

## 2025-07-02 RX ORDER — NEOMYCIN SULFATE, POLYMYXIN B SULFATE, AND DEXAMETHASONE 3.5; 10000; 1 MG/G; [USP'U]/G; MG/G
OINTMENT OPHTHALMIC
Status: DISCONTINUED
Start: 2025-07-02 | End: 2025-07-02 | Stop reason: HOSPADM

## 2025-07-02 RX ORDER — MORPHINE SULFATE 2 MG/ML
2 INJECTION, SOLUTION INTRAMUSCULAR; INTRAVENOUS ONCE
Status: DISCONTINUED | OUTPATIENT
Start: 2025-07-02 | End: 2025-07-02 | Stop reason: HOSPADM

## 2025-07-02 RX ORDER — NEOMYCIN SULFATE, POLYMYXIN B SULFATE, AND DEXAMETHASONE 3.5; 10000; 1 MG/G; [USP'U]/G; MG/G
OINTMENT OPHTHALMIC
Status: DISCONTINUED | OUTPATIENT
Start: 2025-07-02 | End: 2025-07-02 | Stop reason: HOSPADM

## 2025-07-02 RX ORDER — MIDAZOLAM HYDROCHLORIDE 1 MG/ML
INJECTION INTRAMUSCULAR; INTRAVENOUS
Status: DISCONTINUED | OUTPATIENT
Start: 2025-07-02 | End: 2025-07-02

## 2025-07-02 RX ORDER — SODIUM CHLORIDE 0.9 % (FLUSH) 0.9 %
10 SYRINGE (ML) INJECTION
Status: DISCONTINUED | OUTPATIENT
Start: 2025-07-02 | End: 2025-07-02 | Stop reason: HOSPADM

## 2025-07-02 RX ORDER — PHENYLEPHRINE HYDROCHLORIDE 25 MG/ML
SOLUTION/ DROPS OPHTHALMIC
Status: DISCONTINUED
Start: 2025-07-02 | End: 2025-07-02 | Stop reason: HOSPADM

## 2025-07-02 RX ORDER — SODIUM CHLORIDE 9 MG/ML
INJECTION, SOLUTION INTRAVENOUS CONTINUOUS
Status: DISCONTINUED | OUTPATIENT
Start: 2025-07-02 | End: 2025-07-02 | Stop reason: HOSPADM

## 2025-07-02 RX ORDER — HYDROCODONE BITARTRATE AND ACETAMINOPHEN 5; 325 MG/1; MG/1
1 TABLET ORAL EVERY 6 HOURS PRN
Qty: 12 TABLET | Refills: 0 | Status: SHIPPED | OUTPATIENT
Start: 2025-07-02 | End: 2025-07-05

## 2025-07-02 RX ORDER — FENTANYL CITRATE 50 UG/ML
INJECTION, SOLUTION INTRAMUSCULAR; INTRAVENOUS
Status: DISCONTINUED | OUTPATIENT
Start: 2025-07-02 | End: 2025-07-02

## 2025-07-02 RX ADMIN — DEXAMETHASONE SODIUM PHOSPHATE 4 MG: 4 INJECTION, SOLUTION INTRAMUSCULAR; INTRAVENOUS at 01:07

## 2025-07-02 RX ADMIN — LIDOCAINE HYDROCHLORIDE 100 MG: 20 INJECTION INTRAVENOUS at 01:07

## 2025-07-02 RX ADMIN — PHENYLEPHRINE HYDROCHLORIDE 100 MCG: 10 INJECTION INTRAVENOUS at 02:07

## 2025-07-02 RX ADMIN — SODIUM CHLORIDE: 9 INJECTION, SOLUTION INTRAVENOUS at 11:07

## 2025-07-02 RX ADMIN — PROPOFOL 200 MG: 10 INJECTION, EMULSION INTRAVENOUS at 01:07

## 2025-07-02 RX ADMIN — PHENYLEPHRINE HYDROCHLORIDE 100 MCG: 10 INJECTION INTRAVENOUS at 01:07

## 2025-07-02 RX ADMIN — ONDANSETRON 4 MG: 2 INJECTION INTRAMUSCULAR; INTRAVENOUS at 01:07

## 2025-07-02 RX ADMIN — MIDAZOLAM HYDROCHLORIDE 2 MG: 2 INJECTION, SOLUTION INTRAMUSCULAR; INTRAVENOUS at 01:07

## 2025-07-02 RX ADMIN — SODIUM CHLORIDE: 0.9 INJECTION, SOLUTION INTRAVENOUS at 01:07

## 2025-07-02 RX ADMIN — FENTANYL CITRATE 100 MCG: 50 INJECTION, SOLUTION INTRAMUSCULAR; INTRAVENOUS at 01:07

## 2025-07-02 NOTE — OP NOTE
Patient Name:Nataly Lafleur   Date of Surgery:  2025    Medical Record Number:? [id number]   : 1965     Surgeon: Leonardo Snyder MD   Assistant:Serenity Vasquez MD     Pre-op Diagnosis:?   Hypertropia of left eye  History of strabismus surgery, left eye    Post-op Diagnosis:?   Hypertropia of left eye  History of strabismus surgery, left eye    Procedure:   Left inferior oblique 3mm resection with anteriorization and lysis of adhesions (re-operation)  Right inferior rectus recession, 6mm with placement of adjustable suture      Anesthesia: General      Complications: none      INDICATION OF PROCEDURE    Gavin Lafleur  is a  60 y.o.  male  with history of left orbital floor fracture and large left hypertropia. He has had strabismus surgery on the left eye with a persistent undercorrection.  The strabismus has become progressively more difficult to control. The patient was identified in the main operating room holding area. The patient's parents were advised of the risks and benefits of surgical intervention, elected to proceed, and signed an informed consent document.         DESCRIPTION OF PROCEDURE:    The patient was identified in the pre-operative holding area and brought to the operating room, where anesthesia monitoring was established and general anesthesia induced in the supine position. The area about each eye was prepped and draped in the usual sterile fashion and a drop of 2.5% phenylephrine was applied to each eye.      Attention was turned to the left eye and an eyelid speculum placed in the left eye. The globe was grasped at the limbus with a forceps and rotated superonasally. A 1-cm inferotemporal conjunctival incision was created in the fornix with Sebastian scissors. There was conjunctival scarring from previous strabismus surgery. Tenon's capsule was opened revealing bare sclera beneath. A Barnhart hook followed by a Green hook was used to engage the left inferior rectus muscle. A double armed  hook was placed in the surgical wound and blunt dissection was carried out posteriorly using muscle hooks revealing the inferior oblique muscle belly near the vortex vein. The muscle was engaged on a Barnhart hook and brought anteriorly into the surgical field. The muscle was very tight and had surrounding scar tissue from previous orbital surgery. The buddy hook was then replaced by a Green hook. Tenon's attachments were severed and the muscle disinserted from the sclera under direct visualization using Sebastian scissors. Locking forceps were used to secure both ends of the severed muscle. A double armed 6-0 vicryl suture was woven through the severed end of the muscle 3mm posteriorly with locking bites at both poles. The anterior portion of the muscle was cut away for a small resection. The inferior rectus was engaged with a Buddy hook followed by a Green hook.  Using a cross-swords technique, the muscle was reattached to the globe 1 mm temporal to the lateral portion of the inferior rectus insertion using one half depth scleral bites. The suture ends were tied together and the muscle found secure in its new position. The conjunctiva was closed with 6-0 plain gut.    Attention was turned to the right eye and a speculum was placed. The globe was rotated supranasally and a 1-cm inferior-temporal conjunctival incision was created in the fornix using Sebastian scissors. A Barnhart hook, followed by a Green hook, were used to engage the right inferior rectus muscle. The conjunctiva was lifted over the toe of the hook to expose the muscle insertion. Tenon's attachments were severed with Sebastian scissors. Posterior dissection of Tenons capsule and overlying tissue was carried out posteriorly to release adhesions of the inferior rectus to the lower eyelid retractors. A double-armed suture of 6-0 Vicryl was passed through the muscle tendon near its insertion with locking bites at both poles. The muscle was then severed  from the globe with Sebastian scissors. Locking forceps were applied to both poles of the original muscle insertion and the globe positioned in abduction. The Vicryl suture arms were passed at one-half scleral depth at the original muscle insertion. Then, a sliding noose adjustable suture was fashioned over the pole sutures. The muscle was allowed to hang back 6mm to complete the recession.  The muscle was found to be stable in its new position. The conjunctiva was intentionally not closed in anticipation of possible suture adjustment.     The eyelid speculum was removed from the right eye. A drop of dilute Betadine solution was placed in each eye followed by Maxitrol ophthalmic ointment. The patient was awakened from anesthesia and taken to the postoperative recovery area in stable condition, having tolerated the procedure well.     Dr. Snyder was present and participated in the entire procedure.

## 2025-07-02 NOTE — TRANSFER OF CARE
"Anesthesia Transfer of Care Note    Patient: Gavin Lafleur    Procedure(s) Performed: Procedure(s) (LRB):  STRABISMUS SURGERY (Bilateral)    Patient location: PACU    Anesthesia Type: general    Transport from OR: Transported from OR on 6-10 L/min O2 by face mask with adequate spontaneous ventilation    Post pain: adequate analgesia    Post assessment: no apparent anesthetic complications    Post vital signs: stable    Level of consciousness: awake and alert    Nausea/Vomiting: no nausea/vomiting    Complications: none    Transfer of care protocol was followed      Last vitals: Visit Vitals  BP (!) 163/79 (BP Location: Left arm, Patient Position: Lying)   Pulse 83   Temp 36.5 °C (97.7 °F) (Temporal)   Resp 17   Ht 5' 6" (1.676 m)   Wt 78.9 kg (174 lb)   SpO2 100%   BMI 28.08 kg/m²     "

## 2025-07-02 NOTE — PROGRESS NOTES
Pt awaiting to be adjusted post-operatively by Dr. Snyder. Pharmacy delivered prescription to bedside. Family at bedside. Report given to TERESSA Weldon RN.

## 2025-07-02 NOTE — H&P
"Ophthalmology History and Physical     Patient Name:  Gavin Lafleur  MRN:  [id number]  :  1965    Allergies:  Percocet [oxycodone-acetaminophen]    CC:  Here for Surgery    HPI:  Patient presenting for strabismus surgery.    Interval History: No significant changes to past medical history, allergies, or medications.    ROS:  No fevers, chills, chest pain, shortness of breath, nausea or emesis         Past Medical History:   Diagnosis Date    Hyperlipidemia     Hypothyroidism     IFG (impaired fasting glucose)     LARES (nonalcoholic steatohepatitis)     Occlusion and stenosis of right carotid artery     Umbilical hernia      Family History   Problem Relation Name Age of Onset    Cataracts Mother      Glaucoma Mother      Hypertension Mother      Anxiety disorder Mother      Cancer Father      Diabetes Father      Hypertension Father      Diabetes type II Father      Hyperlipidemia Father      Coronary artery disease Father          with CABG    Skin cancer Father      Stroke Sister      Diabetes Brother      Diabetes type II Brother      Heart disease Brother      Cancer Maternal Aunt      Cancer Maternal Uncle       Past Surgical History:   Procedure Laterality Date    ADENOIDECTOMY      BICEPS TENDON REPAIR      KNEE SURGERY Left     ORBITAL FRACTURE SURGERY Left     STRABISMUS SURGERY Left 2025    Procedure: STRABISMUS SURGERY;  Surgeon: Leonardo Snyder MD;  Location: [address];  Service: Ophthalmology;  Laterality: Left;    TONSILLECTOMY      TYMPANOSTOMY TUBE PLACEMENT      UMBILICAL HERNIA REPAIR         Medications marked as taking:  [unfilled]    Vital Signs:  /73 (BP Location: Left arm, Patient Position: Lying)   Pulse 88   Temp 98.2 °F (36.8 °C) (Temporal)   Resp 20   Ht 5' 6" (1.676 m)   Wt 78.9 kg (174 lb)   SpO2 97%   BMI 28.08 kg/m²     Ophthalmology Exam:  Per last ophthalmology clinic note    Heart Exam: As per anesthesia    Lung Exam:  As per " anesthesia    Assessment and Plan:     Gavin Lafleur is a 60 y.o. male presenting for strabismus surgery, both eye    -Written consent present   -Plan to proceed to OR as planned      Leonardo Snyder MD  Pediatric Ophthalmology and Adult Strabismus  [facility]

## 2025-07-02 NOTE — DISCHARGE INSTRUCTIONS
ACTIVITY LEVEL:  If you received sedation or an anesthetic, you may feel sleepy for several hours. Rest until you are more awake. Gradually resume your normal activities in two days. Children may return to school in 3-4 days. It is all right to watch television or to read. Swimming is permitted in two weeks.    CARE OF INCISION:  A blood-tinged discharge from the eye is normal. This can be gently washed away with a clean, damp wash cloth. Do not use water, gauze, or cotton to wipe the lids. The morning after surgery, you may have difficulty opening your eyes. This is normal. If dry blood or secretions are holding the lids together, you may open the eyes by gently  the lids from above and below. Please wash your hands thoroughly before doing this. If the lids dont open, do not force them apart. (A child will cry and the tears will soften the secretions and a parent can try again later.) Use cool compresses to the eyes for 24 hours, if tolerated for comfort. Place maxitrol ointment in eyes three times per day for 7 days     BATHING:  Keep your face out of water for seven days after surgery    DIET:  At home, continue with liquids, and if there is no nausea, you may eat a soft diet. Gradually resume your normal diet.    PAIN:  If needed for discomfort, you can use cold compresses and take Tylenol (usual recommended dose) every four hours. Generally, do not take Tylenol more than four times a day.  If you were prescribed a narcotic, you may take as prescribed.     WHEN TO CALL THE DOCTOR:   Any increase in the amount of swelling of the eyes and adjacent tissues   Heavy yellow discharge from the eyes   Fever over 101ºF (38.4ºC)    A purple discoloration of the lower lids is common. It appears a few days after surgery and does not affect healing. You may experience double vision after surgery. This is normal and should disappear in a few days or weeks. Prescription glasses may be worn unless otherwise  instructed. The eyes may be unusually sensitive to light for several days. Dark sunglasses will help.    FOR EMERGENCIES:  If any unusual problems or difficulties occur, contact Dr. Snyder or the resident at [phone] (page ) or at the Clinic office, [phone].

## 2025-07-02 NOTE — ANESTHESIA PROCEDURE NOTES
Intubation    Date/Time: 7/2/2025 1:29 PM    Performed by: Valentino Vasquez MD  Authorized by: Opal Arzate MD    Intubation:     Induction:  Intravenous    Intubated:  Postinduction    Mask Ventilation:  Easy mask    Attempts:  1    Attempted By:  Resident anesthesiologist    Method of Intubation:  Other (see comments)    Blade:  Other (see comments)    Difficult Airway Encountered?: No      Complications:  None    Airway Device:  Supraglottic airway/LMA    Airway Device Size:  4.0    Style/Cuff Inflation:  Cuffed (inflated to minimal occlusive pressure)    Inflation Amount (mL):  8    Tube secured:  21    Secured at:  The lips    Placement Verified By:  Capnometry    Complicating Factors:  None    Findings Post-Intubation:  BS equal bilateral and atraumatic/condition of teeth unchanged

## 2025-07-02 NOTE — PROGRESS NOTES
Post Operative Suture Adjustment Note    Patient was seen in PACU and eye alignment measured with alternate cover test. Still had large left hypertropia.     The right inferior rectus muscle on adjustable suture was manipulated to place the right inferior rectus at approx 8mm posterior to its insertion.     After suture manipulation, there was a small XT in primary      Leonardo Snyder MD  Pediatric Ophthalmology and Adult Strabismus  [facility]

## 2025-07-02 NOTE — DISCHARGE SUMMARY
Discharge Summary  Ophthalmology Service      Admit Date: 7/2/2025     Discharge Date: 7/2/2025     Attending Physician: Leonardo Snyder MD     Discharge Physician: Leonardo Vasquez MD    Discharged Condition: Good    Reason for Admission: Hypertropia of left eye [H50.22]     Treatments/Procedures: Strabismus Surgery (see dictated report for details).    Hospital Course: Stable, dictated    Consults: None    Significant Diagnostic Studies: None    Disposition: Home    Patient Instructions:   - Resume same diet as prior to surgery  - Resume activity as tolerated with no swimming for 1 week  - Start Maxitrol ointment three times per day for 7 days   - Apply ice packs to surgical eye(s) for 72 hours as tolerated  - Call the Ophthalmology clinic to schedule a follow-up appointment with Dr. Snyder     Patient Instructions:   Current Discharge Medication List        START taking these medications    Details   HYDROcodone-acetaminophen (NORCO) 5-325 mg per tablet Take 1 tablet by mouth every 6 (six) hours as needed for Pain.  Qty: 12 tablet, Refills: 0    Associated Diagnoses: Hypertropia of left eye           CONTINUE these medications which have NOT CHANGED    Details   amLODIPine (NORVASC) 5 MG tablet Take 1 tablet by mouth once daily  Qty: 90 tablet, Refills: 2    Comments: .  Associated Diagnoses: Hypertension associated with type 2 diabetes mellitus      cholecalciferol, vitamin D3, 125 mcg (5,000 unit) capsule Take 5,000 Units by mouth once daily.      coenzyme Q10 100 mg capsule Take 100 mg by mouth once daily.      indapamide (LOZOL) 1.25 MG Tab Take 1 tablet by mouth once daily  Qty: 90 tablet, Refills: 0    Associated Diagnoses: Hypertension, unspecified type      levothyroxine (SYNTHROID) 175 MCG tablet Take 1 tablet (175 mcg total) by mouth before breakfast.  Qty: 90 tablet, Refills: 0      lisinopriL (PRINIVIL,ZESTRIL) 5 MG tablet Take 1 tablet by mouth once daily  Qty: 90 tablet, Refills: 0    Comments:  .  Associated Diagnoses: Hypertension, unspecified type      pitavastatin calcium (LIVALO) 4 mg Tab TAKE 1 TABLET BY MOUTH ONCE DAILY IN THE EVENING  Qty: 90 tablet, Refills: 2    Associated Diagnoses: Hyperlipidemia, unspecified hyperlipidemia type      aspirin (ECOTRIN) 81 MG EC tablet Take 81 mg by mouth every evening.      semaglutide (OZEMPIC) 1 mg/dose (4 mg/3 mL) Inject 1 mg into the skin every 7 days.  Qty: 3 mL, Refills: 5    Associated Diagnoses: Type 2 diabetes mellitus with hyperglycemia, without long-term current use of insulin      traMADoL (ULTRAM) 50 mg tablet Take 1 tablet (50 mg total) by mouth every 6 (six) hours as needed for Pain.  Qty: 12 tablet, Refills: 0    Comments: Quantity prescribed more than 7 day supply? No  Associated Diagnoses: Hypertropia of left eye             No discharge procedures on file.

## 2025-07-02 NOTE — ANESTHESIA POSTPROCEDURE EVALUATION
Anesthesia Post Evaluation    Patient: Gavin Lafleur    Procedure(s) Performed: Procedure(s) (LRB):  STRABISMUS SURGERY (Bilateral)    Final Anesthesia Type: general      Patient location during evaluation: PACU  Patient participation: Yes- Able to Participate  Level of consciousness: awake and alert  Pain management: adequate  Airway patency: patent    PONV status at discharge: No PONV  Anesthetic complications: no      Cardiovascular status: blood pressure returned to baseline  Respiratory status: unassisted  Hydration status: euvolemic  Follow-up not needed.          Vitals Value Taken Time   /83 07/02/25 16:17   Temp 36.5 °C (97.7 °F) 07/02/25 14:45   Pulse 90 07/02/25 16:23   Resp 34 07/02/25 16:23   SpO2 95 % 07/02/25 16:22   Vitals shown include unfiled device data.      No case tracking events are documented in the log.      Pain/Damon Score: Damon Score: 10 (Simultaneous filing. User may not have seen previous data.) (7/2/2025  3:15 PM)

## 2025-07-03 ENCOUNTER — TELEPHONE (OUTPATIENT)
Dept: OPHTHALMOLOGY | Facility: CLINIC | Age: 60
End: 2025-07-03
Payer: COMMERCIAL

## 2025-07-03 DIAGNOSIS — Z98.890 POST-OPERATIVE STATE: Primary | ICD-10-CM

## 2025-07-03 RX ORDER — NEOMYCIN SULFATE, POLYMYXIN B SULFATE, AND DEXAMETHASONE 3.5; 10000; 1 MG/G; [USP'U]/G; MG/G
OINTMENT OPHTHALMIC 4 TIMES DAILY
Qty: 3.5 G | Refills: 1 | Status: SHIPPED | OUTPATIENT
Start: 2025-07-03

## 2025-07-06 ENCOUNTER — TELEPHONE (OUTPATIENT)
Dept: PHARMACY | Facility: CLINIC | Age: 60
End: 2025-07-06
Payer: COMMERCIAL

## 2025-07-06 NOTE — TELEPHONE ENCOUNTER
[facility] Chart Review & Patient Outreach Details For Medication Adherence Project    Reason for Outreach Encounter: 3rd Party payor non-compliance report ([employer], [employer], C, etc)  2.  Patient Outreach Method: Reviewed Patient Chart  3.   Medication in question: Ozempic 1mg/dose   LAST FILLED: 6/16/25 for 28 day supply  Diabetes Medications              semaglutide (OZEMPIC) 1 mg/dose (4 mg/3 mL) Inject 1 mg into the skin every 7 days.              4.  Reviewed and or Updates Made To: Patient Chart  5. Outreach Outcomes and/or actions taken: Patient filled medication and is on track to be adherent

## 2025-07-11 ENCOUNTER — OFFICE VISIT (OUTPATIENT)
Dept: OPHTHALMOLOGY | Facility: CLINIC | Age: 60
End: 2025-07-11
Payer: COMMERCIAL

## 2025-07-11 DIAGNOSIS — H50.22 HYPERTROPIA OF LEFT EYE: Primary | ICD-10-CM

## 2025-07-11 PROCEDURE — 99999 PR PBB SHADOW E&M-EST. PATIENT-LVL III: CPT | Mod: PBBFAC,,, | Performed by: STUDENT IN AN ORGANIZED HEALTH CARE EDUCATION/TRAINING PROGRAM

## 2025-07-11 NOTE — LETTER
July 11, 2025      [facility] - [address]  [address]  [facility] [address]  Phone: [phone]  Fax: [phone]       Patient: Gavin Lafleur   YOB: 1965  Date of Visit: 07/11/2025    To Whom It May Concern:    Carola Lafleur  was at [facility] on 07/11/2025. The patient may return to work on 07/11/2025.  If you have any questions or concerns, or if I can be of further assistance, please do not hesitate to contact me.    Sincerely,    Leonardo Snyder MD

## 2025-07-11 NOTE — ASSESSMENT & PLAN NOTE
Injury sustain to left orbit on 2/2/22   CT Orbits: 1. Mild fat stranding within the inferior retrobulbar left orbit, possibly inflammatory or posttraumatic.  2. Deformity left orbital floor, possibly related to old fracture.  3. Moderate paranasal sinus disease.    11/21/22 - S/p fracture repair with plate with Dr. Gaming    1/6/25: Patient bothered by large degree of strabismus. Has double vision and interferes with daily interactions.  Exam with large left hypertropia. Poor infraduction left eye but only mildly reduced saccades     2/27/25 - strab surgery  Intra-op forced ductions positive on downgaze but improved after taking SR off --> LSRc 9mm and LIR resection 5mm    Had ujndercorrection post operatively    7/2/25 - strab surgery  Left inferior oblique 3mm resection with anteriorization  Right inferior rectus recession, 8mm (after suture adjustment)     Pow1: Still with small undercorrection but much improved. Patient can find head position to give BSV    Plan:   Taper maxitrol once daily for one week then stop  Can resume normal activities  RTC 1 month - can do refraction at that visit

## 2025-08-08 ENCOUNTER — OFFICE VISIT (OUTPATIENT)
Dept: OPHTHALMOLOGY | Facility: CLINIC | Age: 60
End: 2025-08-08
Payer: COMMERCIAL

## 2025-08-08 DIAGNOSIS — H52.13 MYOPIA OF BOTH EYES WITH ASTIGMATISM: ICD-10-CM

## 2025-08-08 DIAGNOSIS — H52.203 MYOPIA OF BOTH EYES WITH ASTIGMATISM: ICD-10-CM

## 2025-08-08 DIAGNOSIS — H50.22 HYPERTROPIA OF LEFT EYE: ICD-10-CM

## 2025-08-08 DIAGNOSIS — Z98.890 POST-OPERATIVE STATE: Primary | ICD-10-CM

## 2025-08-08 PROCEDURE — 99999 PR PBB SHADOW E&M-EST. PATIENT-LVL II: CPT | Mod: PBBFAC,,, | Performed by: STUDENT IN AN ORGANIZED HEALTH CARE EDUCATION/TRAINING PROGRAM

## 2025-08-08 NOTE — ASSESSMENT & PLAN NOTE
Injury sustain to left orbit on 2/2/22   CT Orbits: 1. Mild fat stranding within the inferior retrobulbar left orbit, possibly inflammatory or posttraumatic.  2. Deformity left orbital floor, possibly related to old fracture.  3. Moderate paranasal sinus disease.    11/21/22 - S/p fracture repair with plate with Dr. Gaming    1/6/25: Patient bothered by large degree of strabismus. Has double vision and interferes with daily interactions.  Exam with large left hypertropia. Poor infraduction left eye but only mildly reduced saccades     2/27/25 - strab surgery  Intra-op forced ductions positive on downgaze but improved after taking SR off --> LSRc 9mm and LIR resection 5mm    Had ujndercorrection post operatively    7/2/25 - strab surgery  Left inferior oblique 3mm resection with anteriorization  Right inferior rectus recession, 8mm (after suture adjustment)     POM1: Small overcorretcion, worse in downgaze  Can get BSV with small degree of prism    Plan:   Good outcome  Will give prism glasses to expand binocular field of vision  RTC strabismus PRN

## 2025-08-08 NOTE — PROGRESS NOTES
HPI    60 y.o M is present today for 1 month post-op. S/p strab sx Left inferior   oblique 3mm resection with anteriorization and lysis of adhesions   (re-operation)  Right inferior rectus recession, 6mm with placement of adjustable suture  Pt reports better alignment today. 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     Eye meds: maxtirol janki QHS OS       Last edited by Leonardo Snyder MD on 8/8/2025  2:17 PM.        ROS    Negative for: Constitutional, Gastrointestinal, Neurological, Skin,   Genitourinary, Musculoskeletal, HENT, Endocrine, Cardiovascular, Eyes,   Respiratory, Psychiatric, Allergic/Imm, Heme/Lymph  Last edited by Leonardo Snyder MD on 8/8/2025  3:12 PM.        Assessment /Plan     For exam results, see Encounter Report.    Post-operative state    Hypertropia of left eye        Problem List Items Addressed This Visit          Ophtho    Hypertropia of left eye    Current Assessment & Plan   Injury sustain to left orbit on 2/2/22   CT Orbits: 1. Mild fat stranding within the inferior retrobulbar left orbit, possibly inflammatory or posttraumatic.  2. Deformity left orbital floor, possibly related to old fracture.  3. Moderate paranasal sinus disease.    11/21/22 - S/p fracture repair with plate with Dr. Gaming    1/6/25: Patient bothered by large degree of strabismus. Has double vision and interferes with daily interactions.  Exam with large left hypertropia. Poor infraduction left eye but only mildly reduced saccades     2/27/25 - strab surgery  Intra-op forced ductions positive on downgaze but improved after taking SR off --> LSRc 9mm and LIR resection 5mm    Had ujndercorrection post operatively    7/2/25 - strab surgery  Left inferior oblique 3mm resection with anteriorization  Right inferior rectus recession, 8mm (after suture adjustment)     POM1: Small overcorretcion, worse in downgaze  Can get BSV with small degree of prism    Plan:   Good outcome  Will  give prism glasses to expand binocular field of vision  RTC strabismus PRN              Other Visit Diagnoses         Post-operative state    -  Primary             Leonardo Snyder MD  Pediatric Ophthalmology and Adult Strabismus  [facility]

## (undated) DEVICE — SUT 6/0 18IN PLAIN GUT D/A

## (undated) DEVICE — DRESSING TRANS 2X2 TEGADERM

## (undated) DEVICE — FORCEP CURVED DISP

## (undated) DEVICE — DRAPE STRABISMUS STRL 40X48IN

## (undated) DEVICE — SUT VICRYL CTD 6-0 S-29 12

## (undated) DEVICE — SUT 6/0 18IN PLAIN GUT G-1

## (undated) DEVICE — TRAY MUSCLE LID EYE

## (undated) DEVICE — DRAPE THREE-QUARTER 53X77IN

## (undated) DEVICE — MARKER SKIN FINE TIP

## (undated) DEVICE — SUT SILK 6-0 TG140-8 18IN

## (undated) DEVICE — SOL BETADINE 5%

## (undated) DEVICE — SUT 6/0 18IN COATED VICRYL

## (undated) DEVICE — CORD BIPOLAR 12 FOOT

## (undated) DEVICE — DRAPE EENT SPLIT STERILE

## (undated) DEVICE — DRAPE TOP 53X102IN